# Patient Record
Sex: FEMALE | Race: WHITE | NOT HISPANIC OR LATINO | Employment: FULL TIME | URBAN - METROPOLITAN AREA
[De-identification: names, ages, dates, MRNs, and addresses within clinical notes are randomized per-mention and may not be internally consistent; named-entity substitution may affect disease eponyms.]

---

## 2023-01-27 ENCOUNTER — TRANSFERRED RECORDS (OUTPATIENT)
Dept: HEALTH INFORMATION MANAGEMENT | Facility: CLINIC | Age: 63
End: 2023-01-27

## 2023-03-02 ENCOUNTER — MEDICAL CORRESPONDENCE (OUTPATIENT)
Dept: HEALTH INFORMATION MANAGEMENT | Facility: CLINIC | Age: 63
End: 2023-03-02
Payer: COMMERCIAL

## 2023-03-20 ENCOUNTER — TRANSFERRED RECORDS (OUTPATIENT)
Dept: HEALTH INFORMATION MANAGEMENT | Facility: CLINIC | Age: 63
End: 2023-03-20
Payer: COMMERCIAL

## 2023-05-05 ENCOUNTER — TELEPHONE (OUTPATIENT)
Dept: TRANSPLANT | Facility: CLINIC | Age: 63
End: 2023-05-05
Payer: COMMERCIAL

## 2023-05-05 NOTE — TELEPHONE ENCOUNTER
Called pt regarding PKE 5/8/23 - I have not heard back from her.  Left VM requesting return call explaining I will need to speak w/ her today if she wants to attend on 5/8/23, and that we can reschedule if needed. Provided direct line for return call.

## 2023-06-08 ENCOUNTER — TELEPHONE (OUTPATIENT)
Dept: TRANSPLANT | Facility: CLINIC | Age: 63
End: 2023-06-08
Payer: COMMERCIAL

## 2023-06-08 DIAGNOSIS — Z76.82 ORGAN TRANSPLANT CANDIDATE: ICD-10-CM

## 2023-06-08 DIAGNOSIS — E11.9 DIABETES MELLITUS, TYPE 2 (H): ICD-10-CM

## 2023-06-08 DIAGNOSIS — N18.9 CHRONIC RENAL FAILURE: ICD-10-CM

## 2023-06-08 DIAGNOSIS — I10 ESSENTIAL HYPERTENSION: ICD-10-CM

## 2023-06-08 DIAGNOSIS — Z01.818 ENCOUNTER FOR PRE-TRANSPLANT EVALUATION FOR KIDNEY AND PANCREAS TRANSPLANT: ICD-10-CM

## 2023-06-12 NOTE — TELEPHONE ENCOUNTER
"Contacted patient and introduced myself as their Transplant Coordinator, also introduced the role of the Transplant Coordinator in the transplant process.  Explained the purpose of this call including reviewing next steps and answering questions.    Confirmed Referring Provider, Dialysis Center, and Primary Care Physician. Notified patient of the importance of continued communication with referring providers and primary care physicians.    Reviewed components of transplant evaluation process including necessary appointments, tests, and procedures.    Answered questions for patient regarding evaluation, provided my name and contact information and requested they call with any additional questions.    Determined that patient would like additional information regarding transplant by:     Drop Down choices: Mail, Email, MyChart, Phone Call   Encourage MyChart   Notified patients that they will hear from a Transplant  to schedule evaluation.       Reviewed pt's chart for pre-kidney/pancreas transplant evaluation planning. Pt lives in [place], [place]. Pt has CKD presumably from diabetic nephropathy and follows w/ Dr. Nathanael Ortiz. GFR 8 on 6/7/23, plan to initiate dialysis soon.  Pt is a type 2 diabetic on 20 units Lantus insulin once/day as well as Humalog w/ meals (pt reports she often misses doses).  Pt states she is not checking blood sugars \"as much as she should\" - discussed that this will be critical for transplant candidacy. Most recent A1c 6.9 on 2/26/23.  Other hx includes renal cysts (evaluated via MRI 8/2022 and found to be simple), HTN, prior hx of renal artery stenosis, and anemia. Surgical hx includes appendectomy and cholesystectomy.  BMI 44 - discussed BMI criteria for full evaluation and what \"mini\" evaluation entails - will schedule mini evaluation at this time.  Due for health maintenance.     I also introduced [employer] and asked pt to create an account and view pre-kidney " transplant videos for review with me following evaluation. Informed pt they will hear from scheduling to arrange the evaluation. Smartset orders entered.

## 2023-08-07 ENCOUNTER — TELEPHONE (OUTPATIENT)
Dept: TRANSPLANT | Facility: CLINIC | Age: 63
End: 2023-08-07
Payer: COMMERCIAL

## 2023-08-07 NOTE — TELEPHONE ENCOUNTER
[facility] Call Center    Phone Message    May a detailed message be left on voicemail: yes     Reason for Call: Other: Patient will not come in to do in person appts because she stated that she has mobility issues and runs out of breath. If we reschedule surgeon visits, she will not come in. Will only do telephone visit.      Action Taken: Message routed to:  [facility] SOT    Travel Screening: Not Applicable

## 2023-08-09 NOTE — TELEPHONE ENCOUNTER
Called pt to discuss how she would like to proceed w/ upcoming appts, left VM requesting call back to direct line.

## 2023-08-11 NOTE — TELEPHONE ENCOUNTER
[facility] Call Center    Phone Message    May a detailed message be left on voicemail: no     Reason for Call: Appointment Intake    Patient declined scheduling surgeon visit due to it being ain in-person visit and having mobility issues.  Please let scheduling team know if we can cancel this request or if there is an opportunity to schedule a video/telephone visit with surgeon.  Thank you!    Action Taken: Message routed to:  [facility] DERIK BETTS    Travel Screening: Not Applicable

## 2023-08-22 ENCOUNTER — DOCUMENTATION ONLY (OUTPATIENT)
Dept: TRANSPLANT | Facility: CLINIC | Age: 63
End: 2023-08-22
Payer: COMMERCIAL

## 2023-08-22 NOTE — PROGRESS NOTES
Called pt x 2 for scheduled telephone visit for K/P mini evaluation for high BMI.   Pt did not answer. Will count as a no show.

## 2023-08-31 ENCOUNTER — TRANSFERRED RECORDS (OUTPATIENT)
Dept: MULTI SPECIALTY CLINIC | Facility: CLINIC | Age: 63
End: 2023-08-31

## 2023-08-31 LAB — HBA1C MFR BLD: 6.9 %

## 2023-10-04 ENCOUNTER — TELEPHONE (OUTPATIENT)
Dept: TRANSPLANT | Facility: CLINIC | Age: 63
End: 2023-10-04
Payer: COMMERCIAL

## 2023-10-04 NOTE — TELEPHONE ENCOUNTER
Pt was scheduled for virtual visit w/ RD yesterday at 1300, RD called for visit and pt stated she was busy.  Called pt today to see if she'd like to reschedule. She would like to reschedule for a Tues or Thurs.  Will alert scheduling.

## 2023-10-05 ENCOUNTER — TELEPHONE (OUTPATIENT)
Dept: ENDOCRINOLOGY | Facility: CLINIC | Age: 63
End: 2023-10-05

## 2023-10-05 ENCOUNTER — VIRTUAL VISIT (OUTPATIENT)
Dept: ENDOCRINOLOGY | Facility: CLINIC | Age: 63
End: 2023-10-05
Payer: COMMERCIAL

## 2023-10-05 VITALS — HEIGHT: 66 IN | BODY MASS INDEX: 36.16 KG/M2 | WEIGHT: 225 LBS

## 2023-10-05 DIAGNOSIS — E11.22 TYPE 2 DIABETES MELLITUS WITH CHRONIC KIDNEY DISEASE ON CHRONIC DIALYSIS, WITH LONG-TERM CURRENT USE OF INSULIN (H): ICD-10-CM

## 2023-10-05 DIAGNOSIS — N18.6 TYPE 2 DIABETES MELLITUS WITH CHRONIC KIDNEY DISEASE ON CHRONIC DIALYSIS, WITH LONG-TERM CURRENT USE OF INSULIN (H): ICD-10-CM

## 2023-10-05 DIAGNOSIS — Z99.2 END-STAGE RENAL DISEASE ON HEMODIALYSIS (H): ICD-10-CM

## 2023-10-05 DIAGNOSIS — Z79.4 TYPE 2 DIABETES MELLITUS WITH CHRONIC KIDNEY DISEASE ON CHRONIC DIALYSIS, WITH LONG-TERM CURRENT USE OF INSULIN (H): ICD-10-CM

## 2023-10-05 DIAGNOSIS — E66.812 CLASS 2 SEVERE OBESITY WITH SERIOUS COMORBIDITY AND BODY MASS INDEX (BMI) OF 36.0 TO 36.9 IN ADULT, UNSPECIFIED OBESITY TYPE (H): Primary | ICD-10-CM

## 2023-10-05 DIAGNOSIS — Z99.2 TYPE 2 DIABETES MELLITUS WITH CHRONIC KIDNEY DISEASE ON CHRONIC DIALYSIS, WITH LONG-TERM CURRENT USE OF INSULIN (H): ICD-10-CM

## 2023-10-05 DIAGNOSIS — N18.6 END-STAGE RENAL DISEASE ON HEMODIALYSIS (H): ICD-10-CM

## 2023-10-05 DIAGNOSIS — E66.01 CLASS 2 SEVERE OBESITY WITH SERIOUS COMORBIDITY AND BODY MASS INDEX (BMI) OF 36.0 TO 36.9 IN ADULT, UNSPECIFIED OBESITY TYPE (H): Primary | ICD-10-CM

## 2023-10-05 PROBLEM — N20.0 NEPHROLITHIASIS: Status: ACTIVE | Noted: 2021-04-22

## 2023-10-05 PROBLEM — K21.9 GERD WITHOUT ESOPHAGITIS: Status: ACTIVE | Noted: 2018-08-03

## 2023-10-05 PROBLEM — I31.9 PERICARDITIS: Status: ACTIVE | Noted: 2023-08-25

## 2023-10-05 PROBLEM — I10 BENIGN ESSENTIAL HYPERTENSION: Status: ACTIVE | Noted: 2018-08-03

## 2023-10-05 PROBLEM — E55.9 VITAMIN D DEFICIENCY: Status: ACTIVE | Noted: 2018-08-03

## 2023-10-05 PROCEDURE — 98967 PH1 ASSMT&MGMT NQHP 11-20: CPT | Mod: 95 | Performed by: PHYSICIAN ASSISTANT

## 2023-10-05 ASSESSMENT — PAIN SCALES - GENERAL: PAINLEVEL: NO PAIN (0)

## 2023-10-05 NOTE — PROGRESS NOTES
"20 minutes spent by me on the date of the encounter doing chart review, history and exam, documentation and further activities per the note    New Medical Weight Management Consult    PATIENT:  Caro Au  MRN:         [id number]  :         1960  [place]:         10/5/2023    Dear Dr De Paz,    I had the pleasure of seeing your patient, Caro Au. Full intake/assessment was done to determine barriers to weight loss success and develop a treatment plan. Caro Au is a 62 year old female interested in treatment of medical problems associated with excess weight. She has a height of 5' 6\", a weight of 225 lbs 0 oz, and the calculated Body mass index is 36.32 kg/m .    ESRD on HD since 2023 secondary to Type 2 DM  Nephrologist Van VELEZ, Dr Shahzad Putnam [facility]    Shahzad Putnam MD   [address]   [address]   [place], [place] [id number] [phone] (Work)     DM dx   Lantus 30 units once daily  Humalog with meals 5 units  Victoza in the past but doesn't remember why it was stopped  A1C 6.9  Check BS twice daily with glucometer    Needs to lose weight to qualify for kidney transplant  Has not thought about weight loss surgery or taken AOM in the past    Recently hospitalized at [facility] for Pericarditis      Assessment & Plan   Problem List Items Addressed This Visit          Endocrine Diagnoses    Class 2 severe obesity with serious comorbidity and body mass index (BMI) of 36.0 to 36.9 in adult, unspecified obesity type (H) - Primary    Type 2 diabetes mellitus with renal manifestations (H)       Other    End-stage renal disease on hemodialysis (H)        Weight mgmt consult. BMI 36  Need BMI <35 ideally for kidney transplant  Will reach out to Dr Putnam or Fantasma VELEZ at [facility] to discuss if ozempic ok to try  -no hx of pancreatitis.  No hx of MTC or MEN2      Follow up RD Alecia end of Oct  Follow up RD weight mgmt end of " "Nov video/phone  Follow up MTM phone in 6-8 weeks to follow up ozempic  Follow up Rosalba 3-4 months return MWM    She has the following co-morbidities:        10/5/2023    11:49 AM   --   I have the following health issues associated with obesity Type II Diabetes    High Blood Pressure   I have the following symptoms associated with obesity Knee Pain           10/5/2023    11:49 AM   Referring Provider   Please name the provider who referred you to Medical Weight Management  If you do not know, please answer \"I Don't Know\" I don't know           10/5/2023    11:49 AM   Weight History   How concerned are you about your weight? Not Concerned   I became overweight After Pregnancy   The following factors have contributed to my weight gain Started on Medication that Caused Weight Gain    Eating Wrong Types of Food    Lack of Exercise   I have tried the following methods to lose weight Watching Portions or Calories   My lowest weight since age 20 was 200   My highest weight since age 20 was 275   The most weight I have ever lost was (lbs) 50   I have the following family history of obesity/being overweight I am the only one in my immediate family who is overweight   How has your weight changed over the last year? Lost   How many pounds? 50           10/5/2023    11:49 AM   Diet Recall Review with Patient   If you do eat breakfast, what types of food do you eat? cereal or eggs   If you do eat lunch, what types of food do you typically eat? sandwich   If you do eat supper, what types of food do you typically eat? meat and vegetable   How many glasses of juice do you drink in a typical day? 1   How many of glasses of milk do you drink in a typical day? 1   If you do drink milk, what type? Whole   How many 8oz glasses of sugar containing drinks such as Lee-Aid/sweet tea do you drink in a day? 1   How many cans/bottles of sugar pop/soda/tea/sports drinks do you drink in a day? 0   How many cans/bottles of diet pop/soda/tea or " sports drink do you drink in a day? 0   How often do you have a drink of alcohol? Monthly or Less   If you do drink, how many drinks might you have in a day? 1 or 2           10/5/2023    11:49 AM   Eating Habits   Generally, my meals include foods like these bread, pasta, rice, potatoes, corn, crackers, sweet dessert, pop, or juice A Few Times a Week   Generally, my meals include foods like these fried meats, brats, burgers, french fries, pizza, cheese, chips, or ice cream A Few Times a Week   Eat fast food (like McDonalds, [employer], Taco Bell) Never   Eat at a buffet or sit-down restaurant Less Than Weekly   Eat most of my meals in front of the TV or computer Everyday   Often skip meals, eat at random times, have no regular eating times Never   Rarely sit down for a meal but snack or graze throughout A Few Times a Week   Eat extra snacks between meals Never   Eat most of my food at the end of the day Never   Eat in the middle of the night or wake up at night to eat Never   Eat extra snacks to prevent or correct low blood sugar Once a Week   Eat to prevent acid reflux or stomach pain Never   Worry about not having enough food to eat Never   I eat when I am depressed Never   I eat when I am stressed Never   I eat when I am bored Never   I eat when I am anxious Never   I eat when I am happy or as a reward Never   I feel hungry all the time even if I just have eaten Never   Feeling full is important to me Never   I finish all the food on my plate even if I am already full A Few Times a Week   I can't resist eating delicious food or walk past the good food/smell Never   I eat/snack without noticing that I am eating Never   I eat when I am preparing the meal Never   I eat more than usual when I see others eating Never   I have trouble not eating sweets, ice cream, cookies, or chips if they are around the house Never   I think about food all day Never   What foods, if any, do you crave? Cheese   Please list any other  foods you crave? chocolate and crackers/cheese           10/5/2023    11:49 AM   Amount of Food   I feel out of control when eating Never   I eat a large amount of food, like a loaf of bread, a box of cookies, a pint/quart of ice cream, all at once Never   I eat a large amount of food even when I am not hungry Never   I eat rapidly Never   I eat alone because I feel embarrassed and do not want others to see how much I have eaten Never   I eat until I am uncomfortably full Never   I feel bad, disgusted, or guilty after I overeat Never           10/5/2023    11:49 AM   Activity/Exercise History   How much of a typical 12 hour day do you spend sitting? Less Than Half the Day   How much of a typical 12 hour day do you spend lying down? Less Than Half the Day   How much of a typical day do you spend walking/standing? Less Than Half the Day   How many hours (not including work) do you spend on the TV/Video Games/Computer/Tablet/Phone? 4-5 Hours   How many times a week are you active for the purpose of exercise? 4-5 TImes a Week   What keeps you from being more active? Pain   How many total minutes do you spend doing some activity for the purpose of exercising when you exercise? Less Than 15 Minutes       PAST MEDICAL HISTORY:  Past Medical History:   Diagnosis Date    Diabetes (H) 2005    Hypertension            10/5/2023    11:49 AM   Work/Social History Reviewed With Patient   My employment status is Unemployed   My job is n/a   How much of your job is spent on the computer or phone? Less Than 50%   How many hours do you spend commuting to work daily? 0   What is your marital status? Single   If you have children, are they overweight? Yes   Who do you live with? Son   Who does the food shopping? me           10/5/2023    11:49 AM   Mental Health History Reviewed With Patient   Have you ever been physically or sexually abused? No   How often in the past 2 weeks have you felt little interest or pleasure in doing things?  "Not at all   Over the past 2 weeks how often have you felt down, depressed, or hopeless? Not at all           10/5/2023    11:49 AM   Sleep History Reviewed With Patient   How many hours do you sleep at night? 8       MEDICATIONS:   No current outpatient medications on file.       ALLERGIES:   Allergies   Allergen Reactions    Lisinopril      Hyperkalemia and elevated creat    Niacin Itching and Other (See Comments)     Other reaction(s): itchy,tingling and hot  Other reaction(s): Paresthesias  All over  Other reaction(s): Paresthesias  All over  Other reaction(s): itchy,tingling and hot  Other reaction(s): Paresthesias  All over  Other reaction(s): Paresthesias  All over  All over  All over      Sulfa Antibiotics Unknown       PHYSICAL EXAM:  Objective    Ht 1.676 m (5' 6\")   Wt 102.1 kg (225 lb)   BMI 36.32 kg/m    Physical Exam   healthy, alert, and no distress  PSYCH: Alert and oriented times 3; coherent speech, normal   rate and volume, able to articulate logical thoughts, able   to abstract reason, no tangential thoughts, no hallucinations   or delusions  Her affect is normal  RESP: No cough, no audible wheezing, able to talk in full sentences  Remainder of exam unable to be completed due to telephone visits    Computed FIB-4 Calculation unavailable. Necessary lab results were not found in the last year.    Fib-4 < 1.3: No further evaluation at this point, unless other concerns  - If the Fib-4 is > 2.67,  Fibroscan and elective liver clinic referral  - Intermediate Fib-4 scores: Get a Fibroscan, consider repeating this in 1-2 years.    Sincerely,    Rosalba Dixon PA-C            Virtual Visit Details    Type of service:  Telephone Visit   Phone call duration: 20 minutes   "

## 2023-10-05 NOTE — TELEPHONE ENCOUNTER
Per Mendez Dixon PA-C, called patient's nephrology clinic. Left message with RN to check with MD or PA to see if they have any concerns about ozempic for this patient.    Van VELEZ, Dr Shahzad Putnam   [facility]   [address]   [place], [place] [id number] [phone] (Work)

## 2023-10-05 NOTE — TELEPHONE ENCOUNTER
Reason for Call:  Other call back    Detailed comments: Dr Putnam's office received a call regarding this patient starting Ozempic. This patient isn't managed by them & the care team will need to call [facility] at [phone]    Phone Number Patient can be reached at: Other phone number:  [phone]*    Best Time: any    Can we leave a detailed message on this number? YES    Call taken on 10/5/2023 at 2:53 PM by Davida Bernard

## 2023-10-05 NOTE — Clinical Note
Can you please reach out to pt's nephrology MD or PA and leave a message with their team to see if they have any concerns about ozempic for this pt.  Van VELEZ, Dr Shahzad Putnam [facility] [place]   Shahzad Putnam MD  [address]  [address]  [place] [address]  [phone] (Work)

## 2023-10-05 NOTE — Clinical Note
Follow up RD weight mgmt end of Nov video/phone Follow up MTM phone in 6-8 weeks to follow up ozempic Follow up Rosalba 3-4 months return MWM

## 2023-10-05 NOTE — LETTER
"10/5/2023       RE: Caro Au  [address]  [place] [address]     Dear Colleague,    Thank you for referring your patient, Caro Au, to the [facility] WEIGHT MANAGEMENT CLINIC [place] [facility]. Please see a copy of my visit note below.    20 minutes spent by me on the date of the encounter doing chart review, history and exam, documentation and further activities per the note    New Medical Weight Management Consult    PATIENT:  Caro Au  MRN:         [id number]  :         1960  [place]:         10/5/2023    Dear Dr De Paz,    I had the pleasure of seeing your patient, Caro Au. Full intake/assessment was done to determine barriers to weight loss success and develop a treatment plan. Caro Au is a 62 year old female interested in treatment of medical problems associated with excess weight. She has a height of 5' 6\", a weight of 225 lbs 0 oz, and the calculated Body mass index is 36.32 kg/m .    ESRD on HD since 2023 secondary to Type 2 DM  Nephrologist Van VELEZ, Dr Shahzad Putnam [facility] MD   [address]   [address]   [place], [place] [id number] [phone] (Work)     DM dx   Lantus 30 units once daily  Humalog with meals 5 units  Victoza in the past but doesn't remember why it was stopped  A1C 6.9  Check BS twice daily with glucometer    Needs to lose weight to qualify for kidney transplant  Has not thought about weight loss surgery or taken AOM in the past    Recently hospitalized at [facility] for Pericarditis      Assessment & Plan  Problem List Items Addressed This Visit          Endocrine Diagnoses    Class 2 severe obesity with serious comorbidity and body mass index (BMI) of 36.0 to 36.9 in adult, unspecified obesity type (H) - Primary    Type 2 diabetes mellitus with renal manifestations (H)       Other    " "End-stage renal disease on hemodialysis (H)        Weight mgmt consult. BMI 36  Need BMI <35 ideally for kidney transplant  Will reach out to Dr Putnam or Fantasma VELEZ at [facility] to discuss if ozempic ok to try  -no hx of pancreatitis.  No hx of MTC or MEN2      Follow up PARKER Alecia end of Oct  Follow up RD weight mgmt end of Nov video/phone  Follow up MTM phone in 6-8 weeks to follow up ozempic  Follow up Rosalba 3-4 months return MWM    She has the following co-morbidities:        10/5/2023    11:49 AM   --   I have the following health issues associated with obesity Type II Diabetes    High Blood Pressure   I have the following symptoms associated with obesity Knee Pain           10/5/2023    11:49 AM   Referring Provider   Please name the provider who referred you to Medical Weight Management  If you do not know, please answer \"I Don't Know\" I don't know           10/5/2023    11:49 AM   Weight History   How concerned are you about your weight? Not Concerned   I became overweight After Pregnancy   The following factors have contributed to my weight gain Started on Medication that Caused Weight Gain    Eating Wrong Types of Food    Lack of Exercise   I have tried the following methods to lose weight Watching Portions or Calories   My lowest weight since age 20 was 200   My highest weight since age 20 was 275   The most weight I have ever lost was (lbs) 50   I have the following family history of obesity/being overweight I am the only one in my immediate family who is overweight   How has your weight changed over the last year? Lost   How many pounds? 50           10/5/2023    11:49 AM   Diet Recall Review with Patient   If you do eat breakfast, what types of food do you eat? cereal or eggs   If you do eat lunch, what types of food do you typically eat? sandwich   If you do eat supper, what types of food do you typically eat? meat and vegetable   How many glasses of juice do you drink in a typical day? 1 "   How many of glasses of milk do you drink in a typical day? 1   If you do drink milk, what type? Whole   How many 8oz glasses of sugar containing drinks such as Lee-Aid/sweet tea do you drink in a day? 1   How many cans/bottles of sugar pop/soda/tea/sports drinks do you drink in a day? 0   How many cans/bottles of diet pop/soda/tea or sports drink do you drink in a day? 0   How often do you have a drink of alcohol? Monthly or Less   If you do drink, how many drinks might you have in a day? 1 or 2           10/5/2023    11:49 AM   Eating Habits   Generally, my meals include foods like these bread, pasta, rice, potatoes, corn, crackers, sweet dessert, pop, or juice A Few Times a Week   Generally, my meals include foods like these fried meats, brats, burgers, french fries, pizza, cheese, chips, or ice cream A Few Times a Week   Eat fast food (like [employer], [employer], [employer]) Never   Eat at a buffet or sit-down restaurant Less Than Weekly   Eat most of my meals in front of the TV or computer Everyday   Often skip meals, eat at random times, have no regular eating times Never   Rarely sit down for a meal but snack or graze throughout A Few Times a Week   Eat extra snacks between meals Never   Eat most of my food at the end of the day Never   Eat in the middle of the night or wake up at night to eat Never   Eat extra snacks to prevent or correct low blood sugar Once a Week   Eat to prevent acid reflux or stomach pain Never   Worry about not having enough food to eat Never   I eat when I am depressed Never   I eat when I am stressed Never   I eat when I am bored Never   I eat when I am anxious Never   I eat when I am happy or as a reward Never   I feel hungry all the time even if I just have eaten Never   Feeling full is important to me Never   I finish all the food on my plate even if I am already full A Few Times a Week   I can't resist eating delicious food or walk past the good food/smell Never   I eat/snack  without noticing that I am eating Never   I eat when I am preparing the meal Never   I eat more than usual when I see others eating Never   I have trouble not eating sweets, ice cream, cookies, or chips if they are around the house Never   I think about food all day Never   What foods, if any, do you crave? Cheese   Please list any other foods you crave? chocolate and crackers/cheese           10/5/2023    11:49 AM   Amount of Food   I feel out of control when eating Never   I eat a large amount of food, like a loaf of bread, a box of cookies, a pint/quart of ice cream, all at once Never   I eat a large amount of food even when I am not hungry Never   I eat rapidly Never   I eat alone because I feel embarrassed and do not want others to see how much I have eaten Never   I eat until I am uncomfortably full Never   I feel bad, disgusted, or guilty after I overeat Never           10/5/2023    11:49 AM   Activity/Exercise History   How much of a typical 12 hour day do you spend sitting? Less Than Half the Day   How much of a typical 12 hour day do you spend lying down? Less Than Half the Day   How much of a typical day do you spend walking/standing? Less Than Half the Day   How many hours (not including work) do you spend on the TV/Video Games/Computer/Tablet/Phone? 4-5 Hours   How many times a week are you active for the purpose of exercise? 4-5 TImes a Week   What keeps you from being more active? Pain   How many total minutes do you spend doing some activity for the purpose of exercising when you exercise? Less Than 15 Minutes       PAST MEDICAL HISTORY:  Past Medical History:   Diagnosis Date    Diabetes (H) 2005    Hypertension            10/5/2023    11:49 AM   Work/Social History Reviewed With Patient   My employment status is Unemployed   My job is n/a   How much of your job is spent on the computer or phone? Less Than 50%   How many hours do you spend commuting to work daily? 0   What is your marital status?  "Single   If you have children, are they overweight? Yes   Who do you live with? Son   Who does the food shopping? me           10/5/2023    11:49 AM   Mental Health History Reviewed With Patient   Have you ever been physically or sexually abused? No   How often in the past 2 weeks have you felt little interest or pleasure in doing things? Not at all   Over the past 2 weeks how often have you felt down, depressed, or hopeless? Not at all           10/5/2023    11:49 AM   Sleep History Reviewed With Patient   How many hours do you sleep at night? 8       MEDICATIONS:   No current outpatient medications on file.       ALLERGIES:   Allergies   Allergen Reactions    Lisinopril      Hyperkalemia and elevated creat    Niacin Itching and Other (See Comments)     Other reaction(s): itchy,tingling and hot  Other reaction(s): Paresthesias  All over  Other reaction(s): Paresthesias  All over  Other reaction(s): itchy,tingling and hot  Other reaction(s): Paresthesias  All over  Other reaction(s): Paresthesias  All over  All over  All over      Sulfa Antibiotics Unknown       PHYSICAL EXAM:  Objective   Ht 1.676 m (5' 6\")   Wt 102.1 kg (225 lb)   BMI 36.32 kg/m    Physical Exam   healthy, alert, and no distress  PSYCH: Alert and oriented times 3; coherent speech, normal   rate and volume, able to articulate logical thoughts, able   to abstract reason, no tangential thoughts, no hallucinations   or delusions  Her affect is normal  RESP: No cough, no audible wheezing, able to talk in full sentences  Remainder of exam unable to be completed due to telephone visits    Computed FIB-4 Calculation unavailable. Necessary lab results were not found in the last year.    Fib-4 < 1.3: No further evaluation at this point, unless other concerns  - If the Fib-4 is > 2.67,  Fibroscan and elective liver clinic referral  - Intermediate Fib-4 scores: Get a Fibroscan, consider repeating this in 1-2 years.    Sincerely,    Rosalba Dixon, " ABIMBOLA            Virtual Visit Details    Type of service:  Telephone Visit   Phone call duration: 20 minutes

## 2023-10-05 NOTE — TELEPHONE ENCOUNTER
Left message on confidential voicemail to see if kidney specialist would be ok with patient taking Ozempic. Left call back number. Clinic is only open on M, W, F.

## 2023-10-05 NOTE — NURSING NOTE
Is the patient currently in the state of [place]? YES    Visit mode:TELEPHONE    If the visit is dropped, the patient can be reconnected by: TELEPHONE VISIT: Phone number:   Telephone Information:   Mobile [phone]       Will anyone else be joining the visit? NO  (If patient encounters technical issues they should call [phone] :[id number])    How would you like to obtain your AVS? MyChart    Are changes needed to the allergy or medication list? Yes Pt states med review needed and ran out of time for checkin review.  Will need to review all meds. Pt unable to see them in mychart on small screen on phone.    Reason for visit: Consult    Compa CANCHOLA

## 2023-10-12 ENCOUNTER — LAB REQUISITION (OUTPATIENT)
Dept: LAB | Facility: CLINIC | Age: 63
End: 2023-10-12

## 2023-10-12 DIAGNOSIS — Z12.4 ENCOUNTER FOR SCREENING FOR MALIGNANT NEOPLASM OF CERVIX: ICD-10-CM

## 2023-10-12 PROCEDURE — 87624 HPV HI-RISK TYP POOLED RSLT: CPT | Performed by: PHYSICIAN ASSISTANT

## 2023-10-12 PROCEDURE — G0145 SCR C/V CYTO,THINLAYER,RESCR: HCPCS | Performed by: PHYSICIAN ASSISTANT

## 2023-10-17 LAB
BKR LAB AP GYN ADEQUACY: NORMAL
BKR LAB AP GYN INTERPRETATION: NORMAL
BKR LAB AP HPV REFLEX: NORMAL
BKR LAB AP LMP: NORMAL
BKR LAB AP PREVIOUS ABNL DX: NORMAL
BKR LAB AP PREVIOUS ABNORMAL: NORMAL
PATH REPORT.COMMENTS IMP SPEC: NORMAL
PATH REPORT.COMMENTS IMP SPEC: NORMAL
PATH REPORT.RELEVANT HX SPEC: NORMAL

## 2023-10-19 LAB
HUMAN PAPILLOMA VIRUS 16 DNA: NEGATIVE
HUMAN PAPILLOMA VIRUS 18 DNA: NEGATIVE
HUMAN PAPILLOMA VIRUS FINAL DIAGNOSIS: ABNORMAL
HUMAN PAPILLOMA VIRUS OTHER HR: POSITIVE

## 2023-10-24 NOTE — PROGRESS NOTES
"Pt evaluated via billable telephone visit. Time spent: 30 min  [place] [place]     [facility] Solid Organ Transplant  Outpatient MNT: Kidney Pancreas Transplant Evaluation    Current BMI: 38 (HT 66.5 in,  lbs/108 kg)-  per CE data on 9/26/23  BMI guideline for kidney transplant up to a BMI of 40 / per surgeon discretion  BMI guideline for pancreas transplant up to a BMI of 35 / per surgeon discretion- 220 lbs     Time Spent: 30 minutes  Visit Type: Initial   Referring Physician: Finger  Pt accompanied by: self     History of previous txp: none   Dialysis: yes, HD 7/2023 11 am   Protein supp: Y    Nutrition Assessment  H/o DM II, stones  She saw Rosalba Dixon 10/5 with possible mention of starting ozempic. Pt has not heard any more updates on this.   She has not seen a txp surgeon for mini eval due to desire for virtual visits, mobility concerns navigating clinic building.   She reports h/o depression and eating a lot at this time in her life. She feels her mental health is in a better spot now. She is on prednisone, which ends in ~1 week.   She reports many financial barriers to eating healthy. Her son does the grocery shopping or her daughter in law orders grocery delivery. She reports applying for [facility], but she has not called them back yet to set up service.     Frequency of BG checks: fasting ranges from 120-150   Takes long acting + short acting insulin (same amt each time she eats/set dose)- does not check BG with meal times \"forgets\". She is likely not interested in a CGM due to possible cost with insurance.   Last A1c: 6.7 (12/2022)    Vitamins, Supplements, Pertinent Meds: was taking a few vitamins but stopped -->B complex, vit C, vit D, calcium  Herbal Medicines/Supplements: none     Edema: has improved     Weight hx: lost 40 lbs fluid w/ dialysis start; has been stable @ 235 lbs x 2 weeks   Wt Readings from Last 10 Encounters:   10/05/23 102.1 kg (225 lb)   03/24/23 113.4 " "kg (250 lb)     Diet Recall  Breakfast Eggs & toast x 2 with butter; bowl of cereal + toast; eggs x 2 + orange   Lunch [place] (lunch meat- honey ham/turkey w/ cheese & olson), soup (canned- not LS), hot dogs (x 2)   Dinner Pork loin (3-4 oz) w/ veggies; hot dogs; tuna fish w/ olson (on bread); brats x 2 w/ baked beans   Snacks Calin crackers (2 pieces); popcorn   Beverages Iced tea mix (sugary- 20 oz/day), crystal light flavoring, apple juice concentrate (20 oz/day), milk (16 oz/day)   Alcohol Rare    Dining out None x 1 month- no car      Physical Activity  PT has come and she is doing some exercises they gave her  Walker x 1.5 months after hospitalization- walks up/down the [place] on dialysis days to get to the bus stop  She reports gradual leg weakness x few years    Labs  10/9 Phos 6.0 K 5.8 (high the last few times)    Nutrition Diagnosis  Obesity r/t positive energy balance and inadequate physical activity AEB BMI 38.    Nutrition Intervention  Nutrition education provided:  Discussed sodium intake (low sodium foods and drinks, seasoning food without salt and tips for low sodium diet).  Reviewed processed food intake and how we are looking at both sodium & phosphorus content in these items and how they impact labs. Reviewed h/o high potassium lately.  Consider reducing milk consumption to 8 oz/day maximum. Reviewed some other strategies for wt loss, such as reducing vs eliminating sugary drinks. Pt unsure if she is interested in doing so- she would like some flavored beverages due to \"poor water quality\"- we discussed using crystal light or unsweetened/artificial tea, etc in place of sugar sweetened beverages. We also reviewed increasing veggie intake to every day (consider frozen veggies instead of canned). Pt reports cost is an issue with vegetable intake, as well. Encouraged pt to follow up on [facility] meal delivery.      Sent follow up message to Rosalba about ozempic. Pt plans to see wt mgmt RD in the " coming months.     Patient Understanding: Pt verbalized understanding of education provided.  Expected Engagement: Fair  Follow-Up Plans: PRN     Nutrition Goals  Weight loss per MD discretion     Alecia Rich, RD, LD, CCTD

## 2023-10-26 ENCOUNTER — VIRTUAL VISIT (OUTPATIENT)
Dept: TRANSPLANT | Facility: CLINIC | Age: 63
End: 2023-10-26
Attending: NURSE PRACTITIONER
Payer: COMMERCIAL

## 2023-10-26 DIAGNOSIS — E11.9 DIABETES MELLITUS, TYPE 2 (H): ICD-10-CM

## 2023-10-26 DIAGNOSIS — N18.9 CHRONIC RENAL FAILURE: ICD-10-CM

## 2023-10-26 DIAGNOSIS — I10 ESSENTIAL HYPERTENSION: ICD-10-CM

## 2023-10-26 DIAGNOSIS — Z01.818 ENCOUNTER FOR PRE-TRANSPLANT EVALUATION FOR KIDNEY AND PANCREAS TRANSPLANT: ICD-10-CM

## 2023-10-26 DIAGNOSIS — Z76.82 ORGAN TRANSPLANT CANDIDATE: ICD-10-CM

## 2023-10-26 NOTE — LETTER
"    10/26/2023         RE: Caro Au  [address]  [place] [address]        Dear Colleague,    Thank you for referring your patient, Caro Au, to the [facility] TRANSPLANT CLINIC. Please see a copy of my visit note below.    Pt evaluated via billable telephone visit. Time spent: 30 min  [place] [place]     [facility] Solid Organ Transplant  Outpatient MNT: Kidney Pancreas Transplant Evaluation    Current BMI: 38 (HT 66.5 in,  lbs/108 kg)-  per CE data on 9/26/23  BMI guideline for kidney transplant up to a BMI of 40 / per surgeon discretion  BMI guideline for pancreas transplant up to a BMI of 35 / per surgeon discretion- 220 lbs     Time Spent: 30 minutes  Visit Type: Initial   Referring Physician: Finger  Pt accompanied by: self     History of previous txp: none   Dialysis: yes, HD 7/2023 11 am   Protein supp: Y    Nutrition Assessment  H/o DM II, stones  She saw Rosalba Dixon 10/5 with possible mention of starting ozempic. Pt has not heard any more updates on this.   She has not seen a txp surgeon for mini eval due to desire for virtual visits, mobility concerns navigating clinic building.   She reports h/o depression and eating a lot at this time in her life. She feels her mental health is in a better spot now. She is on prednisone, which ends in ~1 week.   She reports many financial barriers to eating healthy. Her son does the grocery shopping or her daughter in law orders grocery delivery. She reports applying for [facility], but she has not called them back yet to set up service.     Frequency of BG checks: fasting ranges from 120-150   Takes long acting + short acting insulin (same amt each time she eats/set dose)- does not check BG with meal times \"forgets\". She is likely not interested in a CGM due to possible cost with insurance.   Last A1c: 6.7 (12/2022)    Vitamins, Supplements, Pertinent Meds: was taking a few vitamins but stopped -->B " "complex, vit C, vit D, calcium  Herbal Medicines/Supplements: none     Edema: has improved     Weight hx: lost 40 lbs fluid w/ dialysis start; has been stable @ 235 lbs x 2 weeks   Wt Readings from Last 10 Encounters:   10/05/23 102.1 kg (225 lb)   03/24/23 113.4 kg (250 lb)     Diet Recall  Breakfast Eggs & toast x 2 with butter; bowl of cereal + toast; eggs x 2 + orange   Lunch [place] (lunch meat- honey ham/turkey w/ cheese & olson), soup (canned- not LS), hot dogs (x 2)   Dinner Pork loin (3-4 oz) w/ veggies; hot dogs; tuna fish w/ olson (on bread); brats x 2 w/ baked beans   Snacks Calin crackers (2 pieces); popcorn   Beverages Iced tea mix (sugary- 20 oz/day), crystal light flavoring, apple juice concentrate (20 oz/day), milk (16 oz/day)   Alcohol Rare    Dining out None x 1 month- no car      Physical Activity  PT has come and she is doing some exercises they gave her  [place] x 1.5 months after hospitalization- walks up/down the [place] on dialysis days to get to the bus stop  She reports gradual leg weakness x few years    Labs  10/9 Phos 6.0 K 5.8 (high the last few times)    Nutrition Diagnosis  Obesity r/t positive energy balance and inadequate physical activity AEB BMI 38.    Nutrition Intervention  Nutrition education provided:  Discussed sodium intake (low sodium foods and drinks, seasoning food without salt and tips for low sodium diet).  Reviewed processed food intake and how we are looking at both sodium & phosphorus content in these items and how they impact labs. Reviewed h/o high potassium lately.  Consider reducing milk consumption to 8 oz/day maximum. Reviewed some other strategies for wt loss, such as reducing vs eliminating sugary drinks. Pt unsure if she is interested in doing so- she would like some flavored beverages due to \"poor water quality\"- we discussed using crystal light or unsweetened/artificial tea, etc in place of sugar sweetened beverages. We also reviewed increasing veggie intake " to every day (consider frozen veggies instead of canned). Pt reports cost is an issue with vegetable intake, as well. Encouraged pt to follow up on [facility] meal delivery.      Sent follow up message to Rosalba about ozempic. Pt plans to see wt shadi CANALES in the coming months.     Patient Understanding: Pt verbalized understanding of education provided.  Expected Engagement: Fair  Follow-Up Plans: PRN     Nutrition Goals  Weight loss per MD discretion     Alecia Rich, RD, LD, CCTD

## 2023-10-27 NOTE — PATIENT INSTRUCTIONS
Jd Elizondo,  Here are some of the things we talked about. Maybe consider 1-2 things that would fit into your day the best:  - Call [facility] to get set up with meal delivery services  - Consider increasing frozen and/or low sodium canned veggie intake for weight loss. Ideal veggie intake is 3+ cups/day. Frozen veggies average ~$1/bag and this may last you 2-3 meals.  - Consider reducing sugary/caloric beverages for kidney health, weight loss (& financial savings)- milk, sweet tea, juice. Opt for flavored water, crystal light, sugar free mixes, etc. I would limit milk to 4-8 oz only per day for the kidney disease.   - Consider fresh, less processed proteins, such as chicken breast, ground beef, tuna fish, etc instead of hot dogs.     Please let me know if you have questions  Alecia BRANCH  Transplant Dietitian     Meals on a Budget    [email or web address]/diet-nutrition/articles/advice/xdbmuq-vzzrs-vd-the-dialysis-diet    http://www.kidneycommunitykitchen.ca/[email or web address]/uploads/2011/09/Kidney-Friendly-Eating-on-a-Budget.pdf    https://www.dpcedcenter.org/news-events/news/dialysis-diet-on-a-budget/    - Eggs  - Purchase whole chicken, cook, and cut into pieces   - Canned or frozen fruit (berries, pineapple, peaches, pears)  - Frozen or canned veggies (look for low(er) sodium or no added salt varieties); rinsing regular varieties can remove ~30% of the sodium   - Pending potassium/phosphorus lab values, purchase larger tub of plain Greek yogurt instead of individual cups; add own fresh or frozen berries   - Freeze bread to maintain freshness; take out a slice or two as needed (thaws within 5 minutes)  - Purchase large canister of dry oats instead of pre-portioned packets (you will save on cost and sodium); cook oatmeal with almond milk and top with fresh/frozen blueberries  - Inexpensive buys: plain pasta noodles, white or brown rice  - Peanut butter used in moderation (1-2 tablespoons/day) pending potassium/phosphorus lab  values  - Dry beans or lentils; can also look for low sodium/no added salt canned beans and/or rinse   - Buy meat on sale and freeze it   - Eggs for dinner (scrambled, frittata with veggies) can cut down on meat cost   - Canned tuna fish or salmon (also comes in packets--check sodium)

## 2023-11-29 ENCOUNTER — LAB REQUISITION (OUTPATIENT)
Dept: LAB | Facility: CLINIC | Age: 63
End: 2023-11-29

## 2023-11-29 DIAGNOSIS — R87.810 CERVICAL HIGH RISK HUMAN PAPILLOMAVIRUS (HPV) DNA TEST POSITIVE: ICD-10-CM

## 2023-11-29 PROCEDURE — 88305 TISSUE EXAM BY PATHOLOGIST: CPT | Performed by: PATHOLOGY

## 2023-12-02 ENCOUNTER — HEALTH MAINTENANCE LETTER (OUTPATIENT)
Age: 63
End: 2023-12-02

## 2023-12-04 LAB
PATH REPORT.COMMENTS IMP SPEC: NORMAL
PATH REPORT.COMMENTS IMP SPEC: NORMAL
PATH REPORT.FINAL DX SPEC: NORMAL
PATH REPORT.GROSS SPEC: NORMAL
PATH REPORT.MICROSCOPIC SPEC OTHER STN: NORMAL
PATH REPORT.RELEVANT HX SPEC: NORMAL
PHOTO IMAGE: NORMAL

## 2023-12-04 NOTE — PROGRESS NOTES
"Phone-Visit Details    Type of service:  Phone Visit    Phone call duration: 13 minutes    Distant Location (provider location): Offsite (providers home) [facility] WEIGHT MANAGEMENT CLINIC [place]     New Weight Management Nutrition Consultation    Caro Au is a 63 year old female presents today for new weight management nutrition consultation.  Patient referred by AGUSTIN Cavazos on October 5, 2023.    Patient with Co-morbidities of obesity including:      10/5/2023    11:49 AM   --   I have the following health issues associated with obesity Type II Diabetes    High Blood Pressure   I have the following symptoms associated with obesity Knee Pain     Hx of ESRD on hemodialysis. Trying to lose weight for a kidney and pancreas transplant surgery.     BMI of 40 for kidney transplant   BMI of 35 for pancreas transplant     Anthropometrics:  Initial consult weight: 225 lb on 10/5/23.   Estimated body mass index is 36.32 kg/m  as calculated from the following:    Height as of an earlier encounter on 12/5/23: 1.676 m (5' 6\").    Weight as of 10/5/23: 102.1 kg (225 lb).  Current Weight: 245 lb per pt report on home scale     Medications for Weight Loss:  Waiting to hear back from nephrologist to see if they are ok with starting Ozempic.     NUTRITION HISTORY  Food allergies: NKFA  Food intolerances: None   Vitamin/Mineral Supplements: Vitamin C, Vitamin B Complex, Vitamin D3   Previous methods of diet modification for weight loss: watching portions of calories   RD before: Yes, sees transplant dietitian     During previous RD visit in October 2023, patient reported many financial barriers to eating healthy. Her son does the grocery shopping or her daughter in law order grocery delivery.     Not on any special diets for her kidneys.     Diet recall:   Breakfast - cereal or eggs  Lunch - sandwich or soup  Dinner - meat + vegetable or soup  Snacks - Popcorn, cyn crackers  Hydration - Crystal Light with " water (40 oz per day) and whole milk with all meals. Sometimes will do apple juice but no soda.         10/5/2023    11:49 AM   Diet Recall Review with Patient   If you do eat breakfast, what types of food do you eat? cereal or eggs   If you do eat lunch, what types of food do you typically eat? sandwich   If you do eat supper, what types of food do you typically eat? meat and vegetable   How many glasses of juice do you drink in a typical day? 1   How many of glasses of milk do you drink in a typical day? 1   If you do drink milk, what type? Whole   How many 8oz glasses of sugar containing drinks such as Lee-Aid/sweet tea do you drink in a day? 1   How many cans/bottles of sugar pop/soda/tea/sports drinks do you drink in a day? 0   How many cans/bottles of diet pop/soda/tea or sports drink do you drink in a day? 0   How often do you have a drink of alcohol? Monthly or Less   If you do drink, how many drinks might you have in a day? 1 or 2         10/5/2023    11:49 AM   Eating Habits   Generally, my meals include foods like these bread, pasta, rice, potatoes, corn, crackers, sweet dessert, pop, or juice A Few Times a Week   Generally, my meals include foods like these fried meats, brats, burgers, french fries, pizza, cheese, chips, or ice cream A Few Times a Week   Eat fast food (like McDonalds, Burger Crescencio, Taco Bell) Never   Eat at a buffet or sit-down restaurant Less Than Weekly   Eat most of my meals in front of the TV or computer Everyday   Often skip meals, eat at random times, have no regular eating times Never   Rarely sit down for a meal but snack or graze throughout A Few Times a Week   Eat extra snacks between meals Never   Eat most of my food at the end of the day Never   Eat in the middle of the night or wake up at night to eat Never   Eat extra snacks to prevent or correct low blood sugar Once a Week   Eat to prevent acid reflux or stomach pain Never   Worry about not having enough food to eat Never    I eat when I am depressed Never   I eat when I am stressed Never   I eat when I am bored Never   I eat when I am anxious Never   I eat when I am happy or as a reward Never   I feel hungry all the time even if I just have eaten Never   Feeling full is important to me Never   I finish all the food on my plate even if I am already full A Few Times a Week   I can't resist eating delicious food or walk past the good food/smell Never   I eat/snack without noticing that I am eating Never   I eat when I am preparing the meal Never   I eat more than usual when I see others eating Never   I have trouble not eating sweets, ice cream, cookies, or chips if they are around the house Never   I think about food all day Never   What foods, if any, do you crave? Cheese   Please list any other foods you crave? chocolate and crackers/cheese         10/5/2023    11:49 AM   Amount of Food   I feel out of control when eating Never   I eat a large amount of food, like a loaf of bread, a box of cookies, a pint/quart of ice cream, all at once Never   I eat a large amount of food even when I am not hungry Never   I eat rapidly Never   I eat alone because I feel embarrassed and do not want others to see how much I have eaten Never   I eat until I am uncomfortably full Never   I feel bad, disgusted, or guilty after I overeat Never     Physical Activity:  Walking around the house.         10/5/2023    11:49 AM   Activity/Exercise History   How much of a typical 12 hour day do you spend sitting? Less Than Half the Day   How much of a typical 12 hour day do you spend lying down? Less Than Half the Day   How much of a typical day do you spend walking/standing? Less Than Half the Day   How many hours (not including work) do you spend on the TV/Video Games/Computer/Tablet/Phone? 4-5 Hours   How many times a week are you active for the purpose of exercise? 4-5 TImes a Week   What keeps you from being more active? Pain   How many total minutes do you  spend doing some activity for the purpose of exercising when you exercise? Less Than 15 Minutes     Nutrition Prescription  Recommended energy/nutrient modification.    Nutrition Diagnosis  Obesity r/t long history of positive energy balance aeb BMI >30.    Nutrition Intervention  Materials/education provided on hypocaloric diet for weight loss. Discussed Volumetric eating to help satiety level on fewer calories; portion control and healthy food choices (Plate Method and Volumetrics handouts). Co-developed goals to work towards.   Provided pt with list of goals and resources below via kapturem.     Expected Engagement: good  Follow-Up Plans: TBD     Nutrition Goals  1) Have a good source of protein at all meals, aiming for at least 60 grams per day.   2) Try to increase fruits and vegetables.   3) Try to aim for around 64 oz of water per day and limit calorie and sugar containing beverages.     The Plate Method:  Plate method can be used for general guidance on balanced meals/portion sizes (see link below for picture/more information)                 Make 1/2 your plate non starchy vegetables (cauliflower, broccoli, asparagus, [place] sprouts, lettuce, carrots, for example).                3+ oz lean protein sources (salmon/skinless chicken/turkey breast/pork loin/lean cuts of beef/ or non-animal protein such as black, kidney or ortiz beans, tofu/edamame/tempeh)    (Note: 3 oz = deck of cards size)                1/2 to 1 cup carbohydrate choices such as whole grain starches or starchy vegetables or fruit. For example: quinoa, brown rice, barley, potatoes, sweet potatoes, winter squash, peas, corn, or fruit.                Choose ~0-2 added fat servings at a meal (avocados, nut butters, nuts or seeds, olive oil, vegetable oils).    https://[email or web address]    Protein Sources   http://WhoCanHelp.com/923243.pdf     Mindful Eating  http://WhoCanHelp.com/410644.pdf     Summary of Volumetrics Eating  "Plan  http://[employer]/752467.pdf     Hunger Solutions:   Call the [place] Food Help Line at 1-869.556.5798 to talk with a specialist in community and government food assistance programs. They can help you sign-up for SNAP benefits, find food trinh, food shelves and free food in your community and help refer you to any other community assistance programs that you qualify for.   https://www.hungersolutions.org/find-help/    Supplemental Nutrition Assistance Program ([place]):  [place]://[place].gov/dhs/people-we-serve/adults/economic-assistance/food-nutrition/programs-and-services/supplemental-nutrition-assistance-program.jsp    [facility]:  To find a map of food shelves and food distribution pop-ups. Visit www.[facility].gov/foodshelves    Market [place] Program: Spend $10 of EBT, get $10 free at [place].  To find map of farmers markets:  https://www.hungersolutions.org/programs/market-bucks/farmersmarkets/    SpendSmart,Eat Smart  Recipe ideas that are suppose to be more affordable  Calculator that allows you to compare product prices   https://spendsmart.extension.[facility]     Fare For All:  Provides discounted groceries. Up to 40% off retail pricing. You can preorder and  or buy in person, depending on the site. Visit their website for list of 38 locations in [place].  https://Fluid-1eforall.theodSan Juan Regional Medical Centermn.org/    [place]  Get organic produce and sustainably sourced groceries delivered at up to 40% off grocery store prices.  https://www.Vestorly.LapSpace      [facility]  Fresh Produce Distribution Events and Free Food Markets in [place].   https://[facility].org/programs/food-support/    [facility] Health and Wellness Food Shelf:  [address]  Mon-Thurs 10am-4pm  May visit once per month   Items include meat, dairy, bread, and other food, hygiene, cleaning supplies and more. Pet food available upon request.  Additional \"Mini-Market\", " parking lot at [address]  o Free fruit, vegetables, salads, and deli items  o Tuesday & Thursday 9:00 a.m  Nutrition Assistance Program for Seniors (NAPS or  the senior box ).  Eligibility: at least 60 years old & 130% Federal Poverty Guidelines.  To apply, call [employer] [place] [phone].  Free Fresh Food Fridays - Summer Outdoor Distribution:  Fruits & vegetables at [place], 2nd & 4th Fridays 9:30 a.m.  May - September, rain or shine  https://[facility].org/helping-our-neighbors/support-everyday-life/community-food-shelf      [place] Food Shelves ([place]):  https://Vdolg.org/food-shelves/  [place] Food Shelf  [address] (near [address], [place] [id number] [phone]    [facility] Food Shelf  [address] (at [address] [place], [place] [id number] [phone]    Foodmobile - Mobile Food Shelf  Foodmobiles travel throughout [place] and the northern sub[place] of [facility] to bring nutritious food to areas of high need. See list of locations here: https://Vdolg.org/events/    Follow-Up: as needed.     Time spent with patient: 13 minutes.  Aparna Kendrick RD, LD

## 2023-12-05 ENCOUNTER — VIRTUAL VISIT (OUTPATIENT)
Dept: ENDOCRINOLOGY | Facility: CLINIC | Age: 63
End: 2023-12-05
Payer: COMMERCIAL

## 2023-12-05 ENCOUNTER — VIRTUAL VISIT (OUTPATIENT)
Dept: PHARMACY | Facility: CLINIC | Age: 63
End: 2023-12-05
Payer: COMMERCIAL

## 2023-12-05 VITALS — BODY MASS INDEX: 36.32 KG/M2 | HEIGHT: 66 IN

## 2023-12-05 DIAGNOSIS — N18.6 END-STAGE RENAL DISEASE ON HEMODIALYSIS (H): ICD-10-CM

## 2023-12-05 DIAGNOSIS — N18.6 TYPE 2 DIABETES MELLITUS WITH CHRONIC KIDNEY DISEASE ON CHRONIC DIALYSIS, WITH LONG-TERM CURRENT USE OF INSULIN (H): ICD-10-CM

## 2023-12-05 DIAGNOSIS — J30.2 SEASONAL ALLERGIES: ICD-10-CM

## 2023-12-05 DIAGNOSIS — E11.22 TYPE 2 DIABETES MELLITUS WITH CHRONIC KIDNEY DISEASE ON CHRONIC DIALYSIS, WITH LONG-TERM CURRENT USE OF INSULIN (H): ICD-10-CM

## 2023-12-05 DIAGNOSIS — R25.2 LEG CRAMPS: ICD-10-CM

## 2023-12-05 DIAGNOSIS — E11.22 TYPE 2 DIABETES MELLITUS WITH CHRONIC KIDNEY DISEASE ON CHRONIC DIALYSIS, WITH LONG-TERM CURRENT USE OF INSULIN (H): Primary | ICD-10-CM

## 2023-12-05 DIAGNOSIS — Z99.2 END-STAGE RENAL DISEASE ON HEMODIALYSIS (H): ICD-10-CM

## 2023-12-05 DIAGNOSIS — Z99.2 TYPE 2 DIABETES MELLITUS WITH CHRONIC KIDNEY DISEASE ON CHRONIC DIALYSIS, WITH LONG-TERM CURRENT USE OF INSULIN (H): ICD-10-CM

## 2023-12-05 DIAGNOSIS — N18.6 TYPE 2 DIABETES MELLITUS WITH CHRONIC KIDNEY DISEASE ON CHRONIC DIALYSIS, WITH LONG-TERM CURRENT USE OF INSULIN (H): Primary | ICD-10-CM

## 2023-12-05 DIAGNOSIS — F32.A DEPRESSION, UNSPECIFIED DEPRESSION TYPE: ICD-10-CM

## 2023-12-05 DIAGNOSIS — K92.2 GIB (GASTROINTESTINAL BLEEDING): ICD-10-CM

## 2023-12-05 DIAGNOSIS — Z78.9 TAKES DIETARY SUPPLEMENTS: ICD-10-CM

## 2023-12-05 DIAGNOSIS — E66.01 CLASS 2 SEVERE OBESITY WITH SERIOUS COMORBIDITY AND BODY MASS INDEX (BMI) OF 36.0 TO 36.9 IN ADULT, UNSPECIFIED OBESITY TYPE (H): ICD-10-CM

## 2023-12-05 DIAGNOSIS — K92.2 GASTROINTESTINAL HEMORRHAGE, UNSPECIFIED GASTROINTESTINAL HEMORRHAGE TYPE: ICD-10-CM

## 2023-12-05 DIAGNOSIS — Z99.2 TYPE 2 DIABETES MELLITUS WITH CHRONIC KIDNEY DISEASE ON CHRONIC DIALYSIS, WITH LONG-TERM CURRENT USE OF INSULIN (H): Primary | ICD-10-CM

## 2023-12-05 DIAGNOSIS — E66.812 CLASS 2 SEVERE OBESITY WITH SERIOUS COMORBIDITY AND BODY MASS INDEX (BMI) OF 36.0 TO 36.9 IN ADULT, UNSPECIFIED OBESITY TYPE (H): ICD-10-CM

## 2023-12-05 DIAGNOSIS — Z79.4 TYPE 2 DIABETES MELLITUS WITH CHRONIC KIDNEY DISEASE ON CHRONIC DIALYSIS, WITH LONG-TERM CURRENT USE OF INSULIN (H): ICD-10-CM

## 2023-12-05 DIAGNOSIS — Z71.3 NUTRITIONAL COUNSELING: Primary | ICD-10-CM

## 2023-12-05 DIAGNOSIS — I10 BENIGN ESSENTIAL HYPERTENSION: ICD-10-CM

## 2023-12-05 DIAGNOSIS — R19.7 DIARRHEA, UNSPECIFIED TYPE: ICD-10-CM

## 2023-12-05 DIAGNOSIS — F41.9 ANXIETY: ICD-10-CM

## 2023-12-05 DIAGNOSIS — Z79.4 TYPE 2 DIABETES MELLITUS WITH CHRONIC KIDNEY DISEASE ON CHRONIC DIALYSIS, WITH LONG-TERM CURRENT USE OF INSULIN (H): Primary | ICD-10-CM

## 2023-12-05 PROCEDURE — 97802 MEDICAL NUTRITION INDIV IN: CPT | Mod: 95

## 2023-12-05 PROCEDURE — 99207 PR NO CHARGE LOS: CPT | Mod: 95

## 2023-12-05 PROCEDURE — 99605 MTMS BY PHARM NP 15 MIN: CPT | Mod: 93 | Performed by: PHARMACIST

## 2023-12-05 RX ORDER — CALCIUM ACETATE 667 MG/1
667 TABLET ORAL
COMMUNITY
Start: 2023-06-14

## 2023-12-05 RX ORDER — PANTOPRAZOLE SODIUM 40 MG/1
40 TABLET, DELAYED RELEASE ORAL DAILY
COMMUNITY
Start: 2023-09-26

## 2023-12-05 RX ORDER — ACETAMINOPHEN 500 MG
TABLET ORAL
COMMUNITY

## 2023-12-05 RX ORDER — VITAMIN B COMPLEX
1 TABLET ORAL DAILY
COMMUNITY

## 2023-12-05 RX ORDER — BUMETANIDE 1 MG/1
TABLET ORAL
COMMUNITY
Start: 2022-12-20 | End: 2023-12-05

## 2023-12-05 RX ORDER — TRAZODONE HYDROCHLORIDE 50 MG/1
50 TABLET, FILM COATED ORAL
COMMUNITY
Start: 2023-09-21

## 2023-12-05 RX ORDER — AMLODIPINE BESYLATE 5 MG/1
1 TABLET ORAL DAILY
COMMUNITY
Start: 2023-08-25 | End: 2023-12-05

## 2023-12-05 RX ORDER — CARVEDILOL 12.5 MG/1
12.5 TABLET ORAL 2 TIMES DAILY WITH MEALS
COMMUNITY
Start: 2023-01-27

## 2023-12-05 RX ORDER — ASPIRIN 81 MG/1
1 TABLET, CHEWABLE ORAL DAILY
COMMUNITY

## 2023-12-05 RX ORDER — ESCITALOPRAM OXALATE 10 MG/1
10 TABLET ORAL DAILY
COMMUNITY
Start: 2023-09-22

## 2023-12-05 RX ORDER — ALBUTEROL SULFATE 90 UG/1
2 AEROSOL, METERED RESPIRATORY (INHALATION) EVERY 4 HOURS PRN
COMMUNITY
Start: 2023-08-31

## 2023-12-05 RX ORDER — MULTIVIT-MIN/IRON/FOLIC ACID/K 18-600-40
1 CAPSULE ORAL
COMMUNITY
End: 2023-12-05

## 2023-12-05 RX ORDER — MULTIVITAMIN WITH IRON
1 TABLET ORAL
COMMUNITY

## 2023-12-05 RX ORDER — BLOOD-GLUCOSE METER
EACH MISCELLANEOUS
COMMUNITY
Start: 2023-09-29

## 2023-12-05 RX ORDER — ATORVASTATIN CALCIUM 20 MG/1
20 TABLET, FILM COATED ORAL DAILY
COMMUNITY
Start: 2023-09-19

## 2023-12-05 RX ORDER — FLUTICASONE PROPIONATE 50 MCG
2 SPRAY, SUSPENSION (ML) NASAL DAILY
COMMUNITY
Start: 2023-05-31

## 2023-12-05 RX ORDER — INSULIN GLARGINE 100 [IU]/ML
INJECTION, SOLUTION SUBCUTANEOUS
COMMUNITY
Start: 2023-09-21

## 2023-12-05 RX ORDER — GABAPENTIN 300 MG/1
300 CAPSULE ORAL
COMMUNITY
Start: 2023-09-21

## 2023-12-05 ASSESSMENT — PAIN SCALES - GENERAL: PAINLEVEL: NO PAIN (0)

## 2023-12-05 NOTE — NURSING NOTE
Is the patient currently in the state of [place]? YES    Visit mode:TELEPHONE    If the visit is dropped, the patient can be reconnected by: TELEPHONE VISIT: Phone number:   Telephone Information:   Mobile [phone]       Will anyone else be joining the visit? NO  (If patient encounters technical issues they should call [phone])    How would you like to obtain your AVS? MyChart    Are changes needed to the allergy or medication list? No    Reason for visit: Consult    Catalina CANCHOLA

## 2023-12-05 NOTE — NURSING NOTE
Is the patient currently in the state of [place]? YES    Visit mode:TELEPHONE    If the visit is dropped, the patient can be reconnected by: TELEPHONE VISIT: Phone number:   Telephone Information:   Mobile [phone]       Will anyone else be joining the visit? NO  (If patient encounters technical issues they should call [phone]    How would you like to obtain your AVS? MyChart    Are changes needed to the allergy or medication list? No    Reason for visit: Consult    Catalina CANCHOLA

## 2023-12-05 NOTE — PATIENT INSTRUCTIONS
Jd Elizondo,     Follow-up with RD as needed.     Thank you,    Aparna Kendrick, PARKER, LD  If you would like to schedule or reschedule an appointment with the RD, please call [phone]    Nutrition Goals  1) Have a good source of protein at all meals, aiming for at least 60 grams per day.   2) Try to increase fruits and vegetables.   3) Try to aim for around 64 oz of water per day and limit calorie and sugar containing beverages.     The Plate Method:  Plate method can be used for general guidance on balanced meals/portion sizes (see link below for picture/more information)                 Make 1/2 your plate non starchy vegetables (cauliflower, broccoli, asparagus, [place] sprouts, lettuce, carrots, for example).                3+ oz lean protein sources (salmon/skinless chicken/turkey breast/pork loin/lean cuts of beef/ or non-animal protein such as black, kidney or ortiz beans, tofu/edamame/tempeh)    (Note: 3 oz = deck of cards size)                1/2 to 1 cup carbohydrate choices such as whole grain starches or starchy vegetables or fruit. For example: quinoa, brown rice, barley, potatoes, sweet potatoes, winter squash, peas, corn, or fruit.                Choose ~0-2 added fat servings at a meal (avocados, nut butters, nuts or seeds, olive oil, vegetable oils).    https://fvfiles.com/[email or web address]    Protein Sources   http://Clinicient/168147.pdf     Mindful Eating  http://Clinicient/609999.pdf     Summary of Volumetrics Eating Plan  http://[email or web address].com/[email or web address]     Hunger Solutions:   Call the [place] Food Help Line at 1-970.347.3749 to talk with a specialist in community and government food assistance programs. They can help you sign-up for SNAP benefits, find food trinh, food shelves and free food in your community and help refer you to any other community assistance programs that you qualify for.   https://www.hungersolutions.org/find-help/    Supplemental Nutrition Assistance Program  "(SNAP):  [place]://[place].gov/dhs/people-we-serve/adults/economic-assistance/food-nutrition/programs-and-services/supplemental-nutrition-assistance-program.jsp    [facility] Department:  To find a map of food shelves and food distribution pop-ups. Visit www.[facility]/foodshelves    Market [place] Program: Spend $10 of EBT, get $10 free at farmers market.  To find map of farmers markets:  https://www.Macheensolutions.org/programs/market-bucks/farmersmarkets/    SpendSmart,Eat Smart  Recipe ideas that are suppose to be more affordable  Calculator that allows you to compare product prices   https://spendsmart.extension.[facility]     Fare For All:  Provides discounted groceries. Up to 40% off retail pricing. You can preorder and  or buy in person, depending on the site. Visit their website for list of 38 locations in [place].  https://[employer] Inceforall.theCartaviParkwood Behavioral [facility] System.org/    MisfBucketFeet Market  Get organic produce and sustainably sourced groceries delivered at up to 40% off grocery store prices.  https://www.Eponym.Openbay      [facility]  Fresh Produce Distribution Events and Free Food Markets in [place].   https://[facility].org/programs/food-support/    [facility] Health and Wellness Food Shelf:  [address] [place] [address]  Mon-Thurs 10am-4pm  May visit once per month   Items include meat, dairy, bread, and other food, hygiene, cleaning supplies and more. Pet food available upon request.  Additional \"Mini-Market\", parking lot at [address] [place] [address]  o Free fruit, vegetables, salads, and deli items  o Tuesday & Thursday 9:00 a.m  Nutrition Assistance Program for Seniors (NAPS or  the senior box ).  Eligibility: at least 60 years old & 130% Federal Poverty Guidelines.  To apply, call Second [place] [phone].  Free Fresh Food Fridays - Summer Outdoor Distribution:  Fruits & vegetables at [place], 2nd & 4th Fridays 9:30 a.m.  May - September, rain or " shine  https://[facility]us.org/helping-our-neighbors/support-everyday-life/community-food-shelf      [place] Food Shelves ([place]):  https://MYR.org/food-shelves/  [place] Food Shelf  [address] (near [address] [facility]), [place] [address]  [phone]    [place] Food Shelf  [address] (at [address] [place] [address]  [phone]    Foodmobile - Mobile Food Shelf  Foodmobiles travel throughout [place] and the northern [facility] to bring nutritious food to areas of high need. See list of locations here: https://MYR.org/events/      COMPREHENSIVE WEIGHT MANAGEMENT PROGRAM  VIRTUAL SUPPORT GROUPS    For Support Group Information:      We offer support groups for patients who are working on weight loss and considering, preparing for, or have had weight loss surgery.     There is no cost for this opportunity.  You are invited to attend the?Virtual Support Groups?provided by any of the following locations:    [facility] via Microsoft Teams with Neisha Garcia RN  2.   [place] via AisleFinder with Amrit Yates, PhD, LP  3.   [place] via AisleFinder with Rita Rodriguez RN  4.   [facility] via Microsoft Teams with Rita Vera [facility]    The following Support Group information can also be found on our website:  https://www.[facility].org/treatments/weight-loss-and-weight-loss-surgery-support-groups      [facility] Weight Loss Surgery Support Group    [facility] Weight Loss Surgery Support Group  The support group is a patient-lead forum that meets monthly to share experiences, encouragement and education. It is open to those who have had weight loss surgery, are scheduled for surgery, or are considering surgery.   WHEN: This group meets on the 3rd Wednesday of each month from 5:00PM - 6:00PM virtually using Microsoft Teams.   FACILITATOR: Led by Neisha Cornejo, PARKER, LD, RN, the  "program's Clinical Coordinator.   TO REGISTER: Please contact the clinic via ActionFlowt or call the nurse line directly at [phone] to inform our staff that you would like an invite sent to you and to let us know the email you would like the invite sent to. Prior to the meeting, a link with directions on how to join the meeting will be sent to you.    2023 Meetings   September 20  October 18  November 15  December 20    [facility] Support Groups    Connections Bariatric Care Support Group?  This is open to all pre- and post- operative bariatric surgery patients as well as their support system.   WHEN: Starting June 2023, this group meets the 3rd Tuesday of each month from 6:30 PM - 8:00 PM virtually using Microsoft Teams.   FACILITATOR: Led by Amrit Yates, Ph.D who is a Licensed Psychologist with the [facility] [place] Comprehensive Weight Management Program.   TO REGISTER: Please send an email to Amrit Yates, Ph.D., LP at?[email or web address]?if you would like an invitation to the group and to learn about using Microsoft Teams.    2023 Meetings  September 19 Henri Galaviz MD, FACS, [facility],   [facility] and [facility], \"Body Contouring and the Bariatric Surgery Patient\".  October 17: Rita Rodriguez RN, [facility] [place],  Hospital Stay and Compliance   November 21: Bertha Cha RD, LD, [facility] [place],  Holiday Eating   December 19    Connections Post-Operative Bariatric Surgery Support Group  This is a support group for [facility] [place] bariatric patients (and those external to [facility] [place]) who have had bariatric surgery and are at least 3 months post-surgery.  WHEN: This support group meets the 4th Wednesday of the month from 11:00 AM - 12:00 PM virtually using Microsoft Teams.   FACILITATOR: Led by Certified Bariatric Nurse, Rita Rodriguez RN.   TO REGISTER: Please send an email to " "[email or web address] if you would like an invitation to the group and to learn about using Microsoft Teams.    2023 Meetings  September 27 October 25 November 22 December 27    [facility]   Healthy Lifestyle Virtual Support Group      Healthy Lifestyle Group  This is a 60 minute virtual coaching group for those who want to lead a healthier lifestyle. Come together to set goals and overcome barriers in a supportive group environment. We will address the four pillars of health--nutrition, exercise, sleep and emotional well-being.  This group is highly recommended for those who are participating in the 24 week Healthy Lifestyle Plan and our Health Coaching sessions.  WHEN: This group meets the first Friday of the month, 12:30 PM - 1:30 PM online, via a zoom meeting.    FACILITATOR: Led by National Board Certified Health and , Rita Vera, [facility].  TO REGISTER: Please call the Call Center at [phone] to register. You will get an appointment to attend in [place] [employer] [place]. Fifteen minutes prior to the meeting, complete the e-check in and you will get the link to join the meeting.  There is no charge to attend this group and space is limited.    2023 and 2024 Meeting Topics and Dates:    November 3: Introduction to Mindfulness (Learn simple and effective mindfulness practices and how it can benefit you)  December 8: Let's Talk (guided discussion on our wins and challenges)  January 5: New Years Vision: Manifest your Best 2024! (Guided imagery,  journaling and discussion)  February 2: Let's Talk  March 1: 10 Percent Happier by Srikanth Bolaños (Book Bites; a guided discussion on the nuggets of wisdom from favorite wellness books; no need to read the book but highly encouraged)  April 5: Let's Talk  May 3: \"Essentialism; The Disciplined Pursuit of Less by Mauro Escobar (book bites discussion)  June 7: Let's Talk  July 5: NO MEETING, off for the 4th of July " Holiday August 2: The Blue Zones, Secrets for Living a Longer Life by Srikanth York (book bites discussion)

## 2023-12-05 NOTE — PROGRESS NOTES
Medication Therapy Management (MTM) Encounter    ASSESSMENT:                            Medication Adherence/Access: No issues identified    Diabetes   Last A1c at goal < 8%. AM fasting not at goal  mg/dL. Could consider use of Ozempic, would want to again follow up with Nephrology to know their thoughts.      Diarrhea:   Would benefit from follow up with primary care provider regarding status as it appears to be a longstanding issue.     Depression/Anxiety:  Improved, stable. PHQ9, GAD7 in future.     GIB:   Stable.     ESRD on dialysis:   Defer to Nephrology as no access to labs.     Hypertension:   Status unknown. Discussed to continue to monitor blood pressure and if issues of dizziness/lightheadedness return, will require carvedilol decrease again.     Leg Cramps:   Stable.     Allergic Rhinitis:   Stable.     Supplements:   Stable.     PLAN:                            Continue current regimen for now.   Pharmacist to follow up with provider regarding Ozempic start to see if heard back from Nephrology.   Continue to monitor blood pressure. If dizziness/lightheadedness occurs or blood pressure starts to lower as previous, would benefit from decrease carvedilol dose again.     Follow-up: Return in about 6 weeks (around 1/16/2024) for Medication Therapy Management Pharmacist Visit.    SUBJECTIVE/OBJECTIVE:                          Caro Au is a 63 year old female called for an initial visit. She was referred to me from Rosalba Dixon PA-C.      Reason for visit: Ozempic start follow up - was not able to start Ozempic.    Allergies/ADRs: Reviewed in chart  Past Medical History: Reviewed in chart; initially hospitalized for Pericarditis at [place] early 9/2023, then hospitalized 9/19/2023-9/21/2023 for again worsening cardiac symptoms, discharged on prednisone, therapy completed.   Tobacco: She reports that she quit smoking about 15 years ago. Her smoking use included cigarettes and vaping device. She smoked  "an average of 1 pack per day. She has never used smokeless tobacco.  Alcohol: not currently using    Medication Adherence/Access: no issues reported    Diabetes   Lantus 20 units every morning   Humalog 5 units with meals     Patient is not experiencing side effects. She reports that she had to be put on prednisone for pericarditis and finished taper a couple weeks ago and was increased Lantus 30 units daily. When prednisone taper stopped she reports she remained on Lantus at this dose and found she was having lows, 60s-70s. Self decreased back to 20 units daily and no longer having instances of hypoglycemia. Was seen by Rosalba Dixon PA-C in October and was considering starting Ozempic for weight management, but still awaiting to hear from Nephrology regarding thoughts on starting this.   Blood sugar monitorin time(s) daily; Ranges: (patient reported) Fasting- 130s-160s   Current diabetes symptoms: none.   Diet/Exercise: eating 3 meals per day. Breakfast: bowel of cereal, lunch: meat sandwich + fruit + milk; dinner: pork chop, can vegetables, milk. Snacks: popcorn, cyn crackers. Usually will before bed will snack.   Aspirin: 81 mg daily, no issues of bleeding or bruising.      Eye exam in the last 12 months? No    Foot exam: due  Urine Albumin: No results found for: \"UMALCR\"   No results found for: \"A1C\"    Care Everywhere 2023: A1c 6.9%, average glucose 151, [facility]     Diarrhea:   Reports she has had persistent diarrhea in AM since EGD in August. In AM will have 2-3 bowel movements in series then no bowel movements otherwise during the day. Eats rice to improve issues. Gallbladder removed a while back and wasn't having gastrointestinal symptoms after gallbladder removal. Prior to EGD reports regular bowel movements. Finds that at times she is having accidents as can't make it to bathroom on time. Hasn't brought this up to primary care provider yet as she felt she had other things that were " more important to discuss.     Depression/Anxiety:  Escitalopram 10mg once daily, started 9/20/2023  Trazodone 50 mg nightly as needed for sleep     Started after last hospitalization and finds that since then her mood is much improved. She does use Trazodone to help with sleep and does find it helpful when needed.   Patient reports no current medication side effects.     GIB:   Pantoprazole 40 mg once daily     Patient reports no current symptoms or side effects. EGD 8/28/2023.     ESRD on dialysis:   Calcium acetate 667 mg three times daily     D[facility] M, W, F[facility]. Reports consistency with this and no medication side effects. Does report after dialysis was having issues of low blood pressure, has improved since adjustment of medications.     Hypertension:   Carvedilol 12.5 mg twice daily    Patient reports no current medication side effects. She reports that amlodipine was stopped due to lower extremity edema a while back. Had decreased carvedilol but found that blood pressure increased so went back to previous dose. Lower blood pressure after dialysis so has been monitoring fluid status more when dialyzing.   Patient does not self-monitor blood pressure.    BP Readings from Last 3 Encounters:   No data found for BP     Pulse Readings from Last 3 Encounters:   No data found for Pulse     Leg Cramps:   Gabapentin 300 mg daily as needed     Using a couple times per week for leg cramps, helpful when using as needed so prefers to continue doing this way.     Allergic Rhinitis:   Flonase (fluticasone) nasal spray - 1 spray(s) each nostril once daily as needed, not using currently     Patient reports no current medication side effects.    No allergies right now.    Patient feels that current therapy is effective when needed.     Supplements:   Vitamin B complex  Calcium/vitamin D   Vitamin D     Taking longstanding per patient preference. No concerns reported.     Today's Vitals: Ht 5'  "6\" (1.676 m)   BMI 36.32 kg/m    ----------------      I spent 45 minutes with this patient today. I offer these suggestions for consideration by Rosalba Dixon PA-C. A copy of the visit note was provided to the patient's provider(s).    A summary of these recommendations was sent via clinic portal.    Lauren Bloch, PharmD, BCACP   Medication Therapy Management Pharmacist   [facility] [place]    Telemedicine Visit Details  Type of service:  Telephone visit  Start Time:  9:30 AM  End Time:  10:15 AM     Medication Therapy Recommendations  No medication therapy recommendations to display   "

## 2023-12-05 NOTE — LETTER
"12/5/2023       RE: Caro Au  [address]  [place] [address]     Dear Colleague,    Thank you for referring your patient, Caro Au, to the [facility] [place] at [facility]. Please see a copy of my visit note below.    Phone-Visit Details    Type of service:  Phone Visit    Phone call duration: 13 minutes    Distant Location (provider location): Offsite (providers home) [facility] [place]     New Weight Management Nutrition Consultation    Caro Au is a 63 year old female presents today for new weight management nutrition consultation.  Patient referred by AGUSTIN Cavazos on October 5, 2023.    Patient with Co-morbidities of obesity including:      10/5/2023    11:49 AM   --   I have the following health issues associated with obesity Type II Diabetes    High Blood Pressure   I have the following symptoms associated with obesity Knee Pain     Hx of ESRD on hemodialysis. Trying to lose weight for a kidney and pancreas transplant surgery.     BMI of 40 for kidney transplant   BMI of 35 for pancreas transplant     Anthropometrics:  Initial consult weight: 225 lb on 10/5/23.   Estimated body mass index is 36.32 kg/m  as calculated from the following:    Height as of an earlier encounter on 12/5/23: 1.676 m (5' 6\").    Weight as of 10/5/23: 102.1 kg (225 lb).  Current Weight: 245 lb per pt report on home scale     Medications for Weight Loss:  Waiting to hear back from nephrologist to see if they are ok with starting Ozempic.     NUTRITION HISTORY  Food allergies: NKFA  Food intolerances: None   Vitamin/Mineral Supplements: Vitamin C, Vitamin B Complex, Vitamin D3   Previous methods of diet modification for weight loss: watching portions of calories   RD before: Yes, sees transplant dietitian     During previous RD visit in October 2023, patient reported many " financial barriers to eating healthy. Her son does the grocery shopping or her daughter in law order grocery delivery.     Not on any special diets for her kidneys.     Diet recall:   Breakfast - cereal or eggs  Lunch - sandwich or soup  Dinner - meat + vegetable or soup  Snacks - Popcorn, cyn crackers  Hydration - Crystal Light with water (40 oz per day) and whole milk with all meals. Sometimes will do apple juice but no soda.         10/5/2023    11:49 AM   Diet Recall Review with Patient   If you do eat breakfast, what types of food do you eat? cereal or eggs   If you do eat lunch, what types of food do you typically eat? sandwich   If you do eat supper, what types of food do you typically eat? meat and vegetable   How many glasses of juice do you drink in a typical day? 1   How many of glasses of milk do you drink in a typical day? 1   If you do drink milk, what type? Whole   How many 8oz glasses of sugar containing drinks such as Lee-Aid/sweet tea do you drink in a day? 1   How many cans/bottles of sugar pop/soda/tea/sports drinks do you drink in a day? 0   How many cans/bottles of diet pop/soda/tea or sports drink do you drink in a day? 0   How often do you have a drink of alcohol? Monthly or Less   If you do drink, how many drinks might you have in a day? 1 or 2         10/5/2023    11:49 AM   Eating Habits   Generally, my meals include foods like these bread, pasta, rice, potatoes, corn, crackers, sweet dessert, pop, or juice A Few Times a Week   Generally, my meals include foods like these fried meats, brats, burgers, french fries, pizza, cheese, chips, or ice cream A Few Times a Week   Eat fast food (like McDonalds, Burger Crescencio, Taco Bell) Never   Eat at a buffet or sit-down restaurant Less Than Weekly   Eat most of my meals in front of the TV or computer Everyday   Often skip meals, eat at random times, have no regular eating times Never   Rarely sit down for a meal but snack or graze throughout A Few  Times a Week   Eat extra snacks between meals Never   Eat most of my food at the end of the day Never   Eat in the middle of the night or wake up at night to eat Never   Eat extra snacks to prevent or correct low blood sugar Once a Week   Eat to prevent acid reflux or stomach pain Never   Worry about not having enough food to eat Never   I eat when I am depressed Never   I eat when I am stressed Never   I eat when I am bored Never   I eat when I am anxious Never   I eat when I am happy or as a reward Never   I feel hungry all the time even if I just have eaten Never   Feeling full is important to me Never   I finish all the food on my plate even if I am already full A Few Times a Week   I can't resist eating delicious food or walk past the good food/smell Never   I eat/snack without noticing that I am eating Never   I eat when I am preparing the meal Never   I eat more than usual when I see others eating Never   I have trouble not eating sweets, ice cream, cookies, or chips if they are around the house Never   I think about food all day Never   What foods, if any, do you crave? Cheese   Please list any other foods you crave? chocolate and crackers/cheese         10/5/2023    11:49 AM   Amount of Food   I feel out of control when eating Never   I eat a large amount of food, like a loaf of bread, a box of cookies, a pint/quart of ice cream, all at once Never   I eat a large amount of food even when I am not hungry Never   I eat rapidly Never   I eat alone because I feel embarrassed and do not want others to see how much I have eaten Never   I eat until I am uncomfortably full Never   I feel bad, disgusted, or guilty after I overeat Never     Physical Activity:  Walking around the house.         10/5/2023    11:49 AM   Activity/Exercise History   How much of a typical 12 hour day do you spend sitting? Less Than Half the Day   How much of a typical 12 hour day do you spend lying down? Less Than Half the Day   How much  of a typical day do you spend walking/standing? Less Than Half the Day   How many hours (not including work) do you spend on the TV/Video Games/Computer/Tablet/Phone? 4-5 Hours   How many times a week are you active for the purpose of exercise? 4-5 TImes a Week   What keeps you from being more active? Pain   How many total minutes do you spend doing some activity for the purpose of exercising when you exercise? Less Than 15 Minutes     Nutrition Prescription  Recommended energy/nutrient modification.    Nutrition Diagnosis  Obesity r/t long history of positive energy balance aeb BMI >30.    Nutrition Intervention  Materials/education provided on hypocaloric diet for weight loss. Discussed Volumetric eating to help satiety level on fewer calories; portion control and healthy food choices (Plate Method and Volumetrics handouts). Co-developed goals to work towards.   Provided pt with list of goals and resources below via [employer].     Expected Engagement: good  Follow-Up Plans: TBD     Nutrition Goals  1) Have a good source of protein at all meals, aiming for at least 60 grams per day.   2) Try to increase fruits and vegetables.   3) Try to aim for around 64 oz of water per day and limit calorie and sugar containing beverages.     The Plate Method:  Plate method can be used for general guidance on balanced meals/portion sizes (see link below for picture/more information)                 Make 1/2 your plate non starchy vegetables (cauliflower, broccoli, asparagus, [place] sprouts, lettuce, carrots, for example).                3+ oz lean protein sources (salmon/skinless chicken/turkey breast/pork loin/lean cuts of beef/ or non-animal protein such as black, kidney or ortiz beans, tofu/edamame/tempeh)    (Note: 3 oz = deck of cards size)                1/2 to 1 cup carbohydrate choices such as whole grain starches or starchy vegetables or fruit. For example: quinoa, brown rice, barley, potatoes, sweet potatoes, winter  squash, peas, corn, or fruit.                Choose ~0-2 added fat servings at a meal (avocados, nut butters, nuts or seeds, olive oil, vegetable oils).    [email or web address]    Protein Sources   [email or web address]     Mindful Eating  [email or web address]     Summary of Volumetrics Eating Plan  [email or web address]     Hunger Solutions:   Call the [place] Food Help Line at 1-833.143.2970 to talk with a specialist in community and government food assistance programs. They can help you sign-up for SNAP benefits, find food trinh, food shelves and free food in your community and help refer you to any other community assistance programs that you qualify for.   https://www.hungersolutions.org/find-help/    Supplemental Nutrition Assistance Program (SNAP):  https://[place].gov/dhs/people-we-serve/adults/economic-assistance/food-nutrition/programs-and-services/supplemental-nutrition-assistance-program.jsp    [facility] Department:  To find a map of food shelves and food distribution pop-ups. Visit www.[facility].gov/foodshelves    Market [place] Program: Spend $10 of EBT, get $10 free at [employer] market.  To find map of farmers markets:  https://www.[employer].org/programs/market-bucks/farmersmarkets/    SpendSmart,Eat Smart  Recipe ideas that are suppose to be more affordable  Calculator that allows you to compare product prices   https://spendsmart.extension.[facility]     Fare For All:  Provides discounted groceries. Up to 40% off retail pricing. You can preorder and  or buy in person, depending on the site. Visit their website for list of 38 locations in [place].  https://fareforall.thefoodShiprock-[facility].org/    [employer] Market  Get organic produce and sustainably sourced groceries delivered at up to 40% off grocery store prices.  https://www.[employer]      [facility]  Fresh Produce Distribution Events and Free Food Markets in [place].  "  https://[facility].org/programs/food-support/    [facility] Food Shelf:  [address]  Mon-Thurs 10am-4pm  May visit once per month   Items include meat, dairy, bread, and other food, hygiene, cleaning supplies and more. Pet food available upon request.  Additional \"Mini-Market\", parking lot at [address]  o Free fruit, vegetables, salads, and deli items  o Tuesday & Thursday 9:00 a.m  Nutrition Assistance Program for Seniors (NAPS or  the senior box ).  Eligibility: at least 60 years old & 130% Federal Poverty Guidelines.  To apply, call [employer] [place] [phone].  Free Fresh Food Fridays - Summer Outdoor Distribution:  Fruits & vegetables at [place], 2nd & 4th Fridays 9:30 a.m.  May - September, rain or shine  https://[facility].org/helping-our-neighbors/support-everyday-life/community-food-shelf      [place] Food Shelves ([place]):  https://Tapjoy.org/food-shelves/  [place] Food Shelf  [address] (near [address])[place], [place] [id number] [phone]    [facility] Food Shelf  [address] (at [address] [facility], [place] [id number] [phone]    Foodmobile - Mobile Food Shelf  Foodmobiles travel throughout [place] and the [facility] to bring nutritious food to areas of high need. See list of locations here: https://Tapjoy.org/events/    Follow-Up: as needed.     Time spent with patient: 13 minutes.  Aparna Kendrick RD, LD  "

## 2023-12-05 NOTE — Clinical Note
Have we heard back from Nephrology get about he Ozempic/ I know you were going to reach out to them? I haven't heard anything. Was curious if you have.

## 2023-12-23 NOTE — PROGRESS NOTES
Please abstract the following data from this visit with this patient into the appropriate field in Epic:    Other Tests found in the patient's chart through Chart Review/Care Everywhere:    A1c done by this group [facility] on this date: 8/31/2023    Note to Abstraction: If this section is blank, no results were found via Chart Review/Care Everywhere.

## 2024-02-01 ENCOUNTER — TELEPHONE (OUTPATIENT)
Dept: SURGERY | Facility: CLINIC | Age: 64
End: 2024-02-01

## 2024-02-01 ENCOUNTER — VIRTUAL VISIT (OUTPATIENT)
Dept: ENDOCRINOLOGY | Facility: CLINIC | Age: 64
End: 2024-02-01
Payer: COMMERCIAL

## 2024-02-01 VITALS — WEIGHT: 240 LBS | BODY MASS INDEX: 38.57 KG/M2 | HEIGHT: 66 IN

## 2024-02-01 DIAGNOSIS — E66.812 CLASS 2 SEVERE OBESITY WITH SERIOUS COMORBIDITY AND BODY MASS INDEX (BMI) OF 36.0 TO 36.9 IN ADULT, UNSPECIFIED OBESITY TYPE (H): ICD-10-CM

## 2024-02-01 DIAGNOSIS — Z99.2 TYPE 2 DIABETES MELLITUS WITH CHRONIC KIDNEY DISEASE ON CHRONIC DIALYSIS, WITH LONG-TERM CURRENT USE OF INSULIN (H): Primary | ICD-10-CM

## 2024-02-01 DIAGNOSIS — N18.6 TYPE 2 DIABETES MELLITUS WITH CHRONIC KIDNEY DISEASE ON CHRONIC DIALYSIS, WITH LONG-TERM CURRENT USE OF INSULIN (H): Primary | ICD-10-CM

## 2024-02-01 DIAGNOSIS — E11.22 TYPE 2 DIABETES MELLITUS WITH CHRONIC KIDNEY DISEASE ON CHRONIC DIALYSIS, WITH LONG-TERM CURRENT USE OF INSULIN (H): Primary | ICD-10-CM

## 2024-02-01 DIAGNOSIS — E66.01 CLASS 2 SEVERE OBESITY WITH SERIOUS COMORBIDITY AND BODY MASS INDEX (BMI) OF 36.0 TO 36.9 IN ADULT, UNSPECIFIED OBESITY TYPE (H): ICD-10-CM

## 2024-02-01 DIAGNOSIS — Z79.4 TYPE 2 DIABETES MELLITUS WITH CHRONIC KIDNEY DISEASE ON CHRONIC DIALYSIS, WITH LONG-TERM CURRENT USE OF INSULIN (H): Primary | ICD-10-CM

## 2024-02-01 PROCEDURE — 99441 PR PHYSICIAN TELEPHONE EVALUATION 5-10 MIN: CPT | Mod: 93 | Performed by: PHYSICIAN ASSISTANT

## 2024-02-01 ASSESSMENT — PAIN SCALES - GENERAL: PAINLEVEL: NO PAIN (0)

## 2024-02-01 NOTE — LETTER
2024       RE: Caro Au  [address]  [place] [address]     Dear Colleague,    Thank you for referring your patient, Caro Au, to the [facility] [place] [facility]. Please see a copy of my visit note below.      Return Medical Weight Management Note     Caro Au  MRN:  [id number]  :  1960  [place]:  2024    Dear Jerald De Paz MD,    I had the pleasure of seeing your patient Caro Au. 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) - Primary    Relevant Medications    semaglutide (OZEMPIC) 2 MG/3ML pen      Weight mgmt follow up  ESRD on HD since 2023 secondary to Type 2 DM  Needs to lose weight to qualify for kidney transplant    Has gained 15 lbs since last visit    Start Ozempic 0.25mg weekly x 4 weeks, then 0.5mg weekly   Will send message to Dr Putnam      Follow up  RD first available  MTM 6 weeks  Rosalba 3-4 months return MWM      INTERVAL HISTORY:    ESRD on HD since 2023 secondary to Type 2 DM  Nephrologist [place] [facility], Dr Shahzad Putnam [facility]     DM dx   Lantus 30 units once daily  Humalog with meals 5 units  Victoza in the past but doesn't remember why it was stopped  A1C 6.9  Check BS twice daily with glucometer     Needs to lose  weight to qualify for kidney transplant    Since last visit:  -Hospitalization for pericarditis  -gained 15lbs since last visit    Anti-obesity medication history    Current:   None    CURRENT WEIGHT:   240 lbs 0 oz    Initial Weight (lbs): 225 lbs     Cumulative weight loss (lbs): -15  Weight Loss Percentage: -6.67%        1/30/2024    11:18 AM   Changes and Difficulties   I have made the following changes to my diet since my last visit: Eating less and eating healthy foods   With regards to my diet, I am still struggling with: Eating 3 meals a day   I have made the following changes to my activity/exercise since my last visit: Doing it daily   With regards to my activity/exercise, I am still struggling with: Going to the hospital interups it         MEDICATIONS:   Current Outpatient Medications   Medication Sig Dispense Refill    acetaminophen (TYLENOL) 500 MG tablet TAKE TWO TABLETS BY MOUTH EVERY 4 HOURS AS NEEDED FOR PAIN OR FEVER. NO MORE THAN 4 DOSES PER DAY.      aspirin (ASA) 81 MG chewable tablet Take 1 tablet by mouth daily      atorvastatin (LIPITOR) 20 MG tablet Take 20 mg by mouth daily      Blood Glucose Monitoring Suppl (ACCU-CHEK GUIDE ME) w/Device KIT USE TO TEST BLOOD SUGARS      calcium acetate (CALPHRON) 667 MG TABS tablet Take 667 mg by mouth 3 times daily (with meals)      calcium carbonate-vitamin D (OSCAL) 500-5 MG-MCG tablet Take 1 tablet by mouth daily      carvedilol (COREG) 12.5 MG tablet Take 12.5 mg by mouth 2 times daily (with meals)      escitalopram (LEXAPRO) 10 MG tablet Take 10 mg by mouth daily      fluticasone (FLONASE) 50 MCG/ACT nasal spray Spray 2 sprays into both nostrils daily      gabapentin (NEURONTIN) 300 MG capsule Take 300 mg by mouth      Insulin Lispro w/ Trans Port 100 UNIT/ML SOPN Inject 5 Units Subcutaneous 3 times daily      LANTUS SOLOSTAR 100 UNIT/ML soln ADMINISTER 20 UNITS UNDER THE SKIN EVERY MORNING      pantoprazole (PROTONIX) 40 MG EC tablet Take 40 mg by  mouth daily      semaglutide (OZEMPIC) 2 MG/3ML pen Inject 0.5 mg Subcutaneous every 7 days 3 mL 3    traZODone (DESYREL) 50 MG tablet Take 50 mg by mouth nightly as needed for sleep      VENTOLIN  (90 Base) MCG/ACT inhaler Inhale 2 puffs into the lungs every 4 hours as needed      vitamin C with B complex (B COMPLEX-C) tablet Take 1 tablet by mouth      Vitamin D3 (VITAMIN D, CHOLECALCIFEROL,) 25 mcg (1000 units) tablet Take 1 tablet by mouth daily             1/30/2024    11:18 AM   Weight Loss Medication History Reviewed With Patient   Which weight loss medications are you currently taking on a regular basis? None       Lab Requisition on 11/29/2023   Component Date Value Ref Range Status    Case Report 11/29/2023    Final                    Value:Surgical Pathology Report                         Case: [id number]                                  Authorizing Provider:  Lorena Ceja PA-C   Collected:           11/29/2023 01:45 PM          Ordering Location:     [facility]     Received:            11/30/2023 12:16 PM                                 [facility]                                                       Pathologist:           Юлия Art MD                                                             Specimen:    Endocervix                                                                                 Final Diagnosis 11/29/2023    Final                    Value:This result contains rich text formatting which cannot be displayed here.    Clinical Information 11/29/2023    Final                    Value:This result contains rich text formatting which cannot be displayed here.    Gross Description 11/29/2023    Final                    Value:This result contains rich text formatting which cannot be displayed here.    Microscopic Description 11/29/2023    Final                    Value:This result contains rich text formatting which cannot be displayed here.    Performing  "Labs 11/29/2023    Final                    Value:This result contains rich text formatting which cannot be displayed here.       PHYSICAL EXAM:  Objective   Ht 1.676 m (5' 6\")   Wt 108.9 kg (240 lb)   BMI 38.74 kg/m      Vitals - Patient Reported  Pain Score: No Pain (0)        GENERAL: alert and no distress  EYES: Eyes grossly normal to inspection.  No discharge or erythema, or obvious scleral/conjunctival abnormalities.  RESP: No audible wheeze, cough, or visible cyanosis.    SKIN: Visible skin clear. No significant rash, abnormal pigmentation or lesions.  NEURO: Cranial nerves grossly intact.  Mentation and speech appropriate for age.  PSYCH: Appropriate affect, tone, and pace of words        Sincerely,    Rosalba Dixon PA-C      10 minutes spent by me on the date of the encounter doing chart review, history and exam, documentation and further activities per the note        Virtual Visit Details    Type of service:  Telephone Visit   Phone call duration: 10 minutes   "

## 2024-02-01 NOTE — TELEPHONE ENCOUNTER
PA Initiation    Medication: OZEMPIC (0.25 OR 0.5 MG/DOSE) [facility]  Insurance Company: [employer] - Phone [phone] Fax [phone]  Pharmacy Filling the Rx:    Filling Pharmacy Phone:    Filling Pharmacy Fax:    Start Date: 2/1/2024     Key: [id number]

## 2024-02-01 NOTE — NURSING NOTE
Is the patient currently in the state of [place]? YES    Visit mode:TELEPHONE    If the visit is dropped, the patient can be reconnected by: TELEPHONE VISIT: Phone number: [phone]    Will anyone else be joining the visit? NO  (If patient encounters technical issues they should call [phone])    How would you like to obtain your AVS? MyChart    Are changes needed to the allergy or medication list? Yes Please add Colchicine to med list.  Please remove any meds marked not taking and any flagged for removal.    Reason for visit: RECHECK    Wt/ht other than 24 hrs:    Pain more than one location:  no  Desiree CANCHOLA

## 2024-02-01 NOTE — PROGRESS NOTES
Return Medical Weight Management Note     Caro Au  MRN:  [id number]  :  1960  [place]:  2024    Dear Jerald De Paz MD,    I had the pleasure of seeing your patient Caro Au. 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) - Primary    Relevant Medications    semaglutide (OZEMPIC) 2 MG/3ML pen      Weight mgmt follow up  ESRD on HD since 2023 secondary to Type 2 DM  Needs to lose weight to qualify for kidney transplant    Has gained 15 lbs since last visit    Start Ozempic 0.25mg weekly x 4 weeks, then 0.5mg weekly   Will send message to Dr Putnam      Follow up  RD first available  MTM 6 weeks  Rosalba 3-4 months return MWM      INTERVAL HISTORY:    ESRD on HD since 2023 secondary to Type 2 DM  Nephrologist [facility], Dr Shahzad Putnam [facility] [place]     DM dx   Lantus 30 units once daily  Humalog with meals 5 units  Victoza in the past but doesn't remember why it was stopped  A1C 6.9  Check BS twice daily with glucometer     Needs to lose weight to qualify for kidney transplant    Since last visit:  -Hospitalization for pericarditis  -gained 15lbs since last visit    Anti-obesity medication history    Current:   None    CURRENT WEIGHT:   240 lbs 0 oz    Initial Weight (lbs): 225 lbs     Cumulative weight loss (lbs): -15  Weight Loss Percentage: -6.67%         1/30/2024    11:18 AM   Changes and Difficulties   I have made the following changes to my diet since my last visit: Eating less and eating healthy foods   With regards to my diet, I am still struggling with: Eating 3 meals a day   I have made the following changes to my activity/exercise since my last visit: Doing it daily   With regards to my activity/exercise, I am still struggling with: Going to the hospital interups it         MEDICATIONS:   Current Outpatient Medications   Medication Sig Dispense Refill    acetaminophen (TYLENOL) 500 MG tablet TAKE TWO TABLETS BY MOUTH EVERY 4 HOURS AS NEEDED FOR PAIN OR FEVER. NO MORE THAN 4 DOSES PER DAY.      aspirin (ASA) 81 MG chewable tablet Take 1 tablet by mouth daily      atorvastatin (LIPITOR) 20 MG tablet Take 20 mg by mouth daily      Blood Glucose Monitoring Suppl (ACCU-CHEK GUIDE ME) w/Device KIT USE TO TEST BLOOD SUGARS      calcium acetate (CALPHRON) 667 MG TABS tablet Take 667 mg by mouth 3 times daily (with meals)      calcium carbonate-vitamin D (OSCAL) 500-5 MG-MCG tablet Take 1 tablet by mouth daily      carvedilol (COREG) 12.5 MG tablet Take 12.5 mg by mouth 2 times daily (with meals)      escitalopram (LEXAPRO) 10 MG tablet Take 10 mg by mouth daily      fluticasone (FLONASE) 50 MCG/ACT nasal spray Spray 2 sprays into both nostrils daily      gabapentin (NEURONTIN) 300 MG capsule Take 300 mg by mouth      Insulin Lispro w/ Trans Port 100 UNIT/ML SOPN Inject 5 Units Subcutaneous 3 times daily      LANTUS SOLOSTAR 100 UNIT/ML soln ADMINISTER 20 UNITS UNDER THE SKIN EVERY MORNING      pantoprazole (PROTONIX) 40 MG EC tablet Take 40 mg by mouth daily      semaglutide (OZEMPIC) 2 MG/3ML pen Inject 0.5 mg Subcutaneous every 7 days 3 mL 3    traZODone (DESYREL) 50 MG tablet Take 50 mg by mouth nightly as needed for sleep      VENTOLIN  (90 Base) MCG/ACT inhaler Inhale 2 puffs into the lungs every 4 hours as needed      vitamin C with B complex (B  "COMPLEX-C) tablet Take 1 tablet by mouth      Vitamin D3 (VITAMIN D, CHOLECALCIFEROL,) 25 mcg (1000 units) tablet Take 1 tablet by mouth daily             1/30/2024    11:18 AM   Weight Loss Medication History Reviewed With Patient   Which weight loss medications are you currently taking on a regular basis? None       Lab Requisition on 11/29/2023   Component Date Value Ref Range Status    Case Report 11/29/2023    Final                    Value:Surgical Pathology Report                         Case: [id number]                                  Authorizing Provider:  Lorena Ceja PA-C   Collected:           11/29/2023 01:45 PM          Ordering Location:     [facility]     Received:            11/30/2023 12:16 PM                                 [facility]                                                       Pathologist:           Юлия Art MD                                                             Specimen:    Endocervix                                                                                 Final Diagnosis 11/29/2023    Final                    Value:This result contains rich text formatting which cannot be displayed here.    Clinical Information 11/29/2023    Final                    Value:This result contains rich text formatting which cannot be displayed here.    Gross Description 11/29/2023    Final                    Value:This result contains rich text formatting which cannot be displayed here.    Microscopic Description 11/29/2023    Final                    Value:This result contains rich text formatting which cannot be displayed here.    Performing Labs 11/29/2023    Final                    Value:This result contains rich text formatting which cannot be displayed here.       PHYSICAL EXAM:  Objective    Ht 1.676 m (5' 6\")   Wt 108.9 kg (240 lb)   BMI 38.74 kg/m      Vitals - Patient Reported  Pain Score: No Pain (0)        GENERAL: alert and no " distress  EYES: Eyes grossly normal to inspection.  No discharge or erythema, or obvious scleral/conjunctival abnormalities.  RESP: No audible wheeze, cough, or visible cyanosis.    SKIN: Visible skin clear. No significant rash, abnormal pigmentation or lesions.  NEURO: Cranial nerves grossly intact.  Mentation and speech appropriate for age.  PSYCH: Appropriate affect, tone, and pace of words        Sincerely,    Rosalba Dixon PA-C      10 minutes spent by me on the date of the encounter doing chart review, history and exam, documentation and further activities per the note        Virtual Visit Details    Type of service:  Telephone Visit   Phone call duration: 10 minutes

## 2024-02-02 NOTE — TELEPHONE ENCOUNTER
Ozempic denied, please refer to denial letter below. Please advise on how you would like to proceed. If appealed please provide medical rational.

## 2024-02-02 NOTE — TELEPHONE ENCOUNTER
PRIOR AUTHORIZATION DENIED    Medication: OZEMPIC (0.25 OR 0.5 MG/DOSE) [facility]  Insurance Company: [employer] - Phone [phone] Fax [phone]  Denial Date: 2/1/2024  Denial Reason(s):   Appeal Information:

## 2024-04-20 ENCOUNTER — HEALTH MAINTENANCE LETTER (OUTPATIENT)
Age: 64
End: 2024-04-20

## 2024-07-19 ENCOUNTER — TELEPHONE (OUTPATIENT)
Dept: TRANSPLANT | Facility: CLINIC | Age: 64
End: 2024-07-19
Payer: COMMERCIAL

## 2024-07-26 ENCOUNTER — TELEPHONE (OUTPATIENT)
Dept: TRANSPLANT | Facility: CLINIC | Age: 64
End: 2024-07-26
Payer: COMMERCIAL

## 2024-07-26 NOTE — TELEPHONE ENCOUNTER
Spoke to Caro today to see how she was doing. Her BMI still remains high at 41. Once she loses to BMI of 40 which is a loss of 7 more lbs she will call Desiree to get her PKE scheduled. She fell at home and now is in [facility] in [place]. She has been on dialysis for about a year now.

## 2024-07-30 ENCOUNTER — DOCUMENTATION ONLY (OUTPATIENT)
Dept: TRANSPLANT | Facility: CLINIC | Age: 64
End: 2024-07-30
Payer: COMMERCIAL

## 2024-09-07 ENCOUNTER — HEALTH MAINTENANCE LETTER (OUTPATIENT)
Age: 64
End: 2024-09-07

## 2024-11-16 ENCOUNTER — HEALTH MAINTENANCE LETTER (OUTPATIENT)
Age: 64
End: 2024-11-16

## 2025-01-05 ENCOUNTER — HEALTH MAINTENANCE LETTER (OUTPATIENT)
Age: 65
End: 2025-01-05

## 2025-04-20 ENCOUNTER — HEALTH MAINTENANCE LETTER (OUTPATIENT)
Age: 65
End: 2025-04-20

## 2025-08-03 ENCOUNTER — HEALTH MAINTENANCE LETTER (OUTPATIENT)
Age: 65
End: 2025-08-03